# Patient Record
Sex: MALE | Race: BLACK OR AFRICAN AMERICAN | Employment: UNEMPLOYED | URBAN - METROPOLITAN AREA
[De-identification: names, ages, dates, MRNs, and addresses within clinical notes are randomized per-mention and may not be internally consistent; named-entity substitution may affect disease eponyms.]

---

## 2018-03-25 ENCOUNTER — HOSPITAL ENCOUNTER (EMERGENCY)
Age: 58
Discharge: HOME | End: 2018-03-25
Attending: EMERGENCY MEDICINE
Payer: COMMERCIAL

## 2018-03-25 ENCOUNTER — APPOINTMENT (OUTPATIENT)
Dept: GENERAL RADIOLOGY | Age: 58
End: 2018-03-25
Attending: EMERGENCY MEDICINE
Payer: COMMERCIAL

## 2018-03-25 VITALS
DIASTOLIC BLOOD PRESSURE: 116 MMHG | OXYGEN SATURATION: 97 % | RESPIRATION RATE: 20 BRPM | TEMPERATURE: 97.6 F | HEART RATE: 99 BPM | SYSTOLIC BLOOD PRESSURE: 210 MMHG

## 2018-03-25 DIAGNOSIS — I50.9 ACUTE CONGESTIVE HEART FAILURE, UNSPECIFIED CONGESTIVE HEART FAILURE TYPE: Primary | ICD-10-CM

## 2018-03-25 LAB
ALBUMIN SERPL-MCNC: 3.5 G/DL (ref 3.4–5)
ALBUMIN/GLOB SERPL: 0.8 {RATIO} (ref 0.8–1.7)
ALP SERPL-CCNC: 106 U/L (ref 45–117)
ALT SERPL-CCNC: 20 U/L (ref 16–61)
ANION GAP SERPL CALC-SCNC: 10 MMOL/L (ref 3–18)
AST SERPL-CCNC: 19 U/L (ref 15–37)
BASOPHILS # BLD: 0 K/UL (ref 0–0.1)
BASOPHILS NFR BLD: 0 % (ref 0–2)
BILIRUB SERPL-MCNC: 0.9 MG/DL (ref 0.2–1)
BNP SERPL-MCNC: 6267 PG/ML (ref 0–900)
BUN SERPL-MCNC: 16 MG/DL (ref 7–18)
BUN/CREAT SERPL: 16 (ref 12–20)
CALCIUM SERPL-MCNC: 9 MG/DL (ref 8.5–10.1)
CHLORIDE SERPL-SCNC: 109 MMOL/L (ref 100–108)
CK MB CFR SERPL CALC: 2.6 % (ref 0–4)
CK MB CFR SERPL CALC: 3.4 % (ref 0–4)
CK MB SERPL-MCNC: 3.5 NG/ML (ref 5–25)
CK MB SERPL-MCNC: 3.9 NG/ML (ref 5–25)
CK SERPL-CCNC: 116 U/L (ref 39–308)
CK SERPL-CCNC: 137 U/L (ref 39–308)
CO2 SERPL-SCNC: 24 MMOL/L (ref 21–32)
CREAT SERPL-MCNC: 0.97 MG/DL (ref 0.6–1.3)
DIFFERENTIAL METHOD BLD: ABNORMAL
EOSINOPHIL # BLD: 0.1 K/UL (ref 0–0.4)
EOSINOPHIL NFR BLD: 1 % (ref 0–5)
ERYTHROCYTE [DISTWIDTH] IN BLOOD BY AUTOMATED COUNT: 15.2 % (ref 11.6–14.5)
GLOBULIN SER CALC-MCNC: 4.2 G/DL (ref 2–4)
GLUCOSE SERPL-MCNC: 99 MG/DL (ref 74–99)
HCT VFR BLD AUTO: 32.6 % (ref 36–48)
HGB BLD-MCNC: 11.5 G/DL (ref 13–16)
INR PPP: 1.2 (ref 0.8–1.2)
LYMPHOCYTES # BLD: 1.8 K/UL (ref 0.9–3.6)
LYMPHOCYTES NFR BLD: 20 % (ref 21–52)
MAGNESIUM SERPL-MCNC: 2 MG/DL (ref 1.6–2.6)
MCH RBC QN AUTO: 28.5 PG (ref 24–34)
MCHC RBC AUTO-ENTMCNC: 35.3 G/DL (ref 31–37)
MCV RBC AUTO: 80.9 FL (ref 74–97)
MONOCYTES # BLD: 0.6 K/UL (ref 0.05–1.2)
MONOCYTES NFR BLD: 6 % (ref 3–10)
NEUTS SEG # BLD: 6.9 K/UL (ref 1.8–8)
NEUTS SEG NFR BLD: 73 % (ref 40–73)
PLATELET # BLD AUTO: 196 K/UL (ref 135–420)
PMV BLD AUTO: 10.8 FL (ref 9.2–11.8)
POTASSIUM SERPL-SCNC: 3.4 MMOL/L (ref 3.5–5.5)
PROT SERPL-MCNC: 7.7 G/DL (ref 6.4–8.2)
PROTHROMBIN TIME: 14.4 SEC (ref 11.5–15.2)
RBC # BLD AUTO: 4.03 M/UL (ref 4.7–5.5)
SODIUM SERPL-SCNC: 143 MMOL/L (ref 136–145)
TROPONIN I SERPL-MCNC: 0.12 NG/ML (ref 0–0.04)
TROPONIN I SERPL-MCNC: 0.14 NG/ML (ref 0–0.04)
WBC # BLD AUTO: 9.4 K/UL (ref 4.6–13.2)

## 2018-03-25 PROCEDURE — 83880 ASSAY OF NATRIURETIC PEPTIDE: CPT | Performed by: EMERGENCY MEDICINE

## 2018-03-25 PROCEDURE — 80053 COMPREHEN METABOLIC PANEL: CPT | Performed by: EMERGENCY MEDICINE

## 2018-03-25 PROCEDURE — 99284 EMERGENCY DEPT VISIT MOD MDM: CPT

## 2018-03-25 PROCEDURE — 85610 PROTHROMBIN TIME: CPT | Performed by: EMERGENCY MEDICINE

## 2018-03-25 PROCEDURE — 83735 ASSAY OF MAGNESIUM: CPT | Performed by: EMERGENCY MEDICINE

## 2018-03-25 PROCEDURE — 93005 ELECTROCARDIOGRAM TRACING: CPT

## 2018-03-25 PROCEDURE — 82553 CREATINE MB FRACTION: CPT | Performed by: EMERGENCY MEDICINE

## 2018-03-25 PROCEDURE — 85025 COMPLETE CBC W/AUTO DIFF WBC: CPT | Performed by: EMERGENCY MEDICINE

## 2018-03-25 PROCEDURE — 71045 X-RAY EXAM CHEST 1 VIEW: CPT

## 2018-03-25 RX ORDER — FUROSEMIDE 40 MG/1
40 TABLET ORAL DAILY
Qty: 20 TAB | Refills: 0 | Status: SHIPPED | OUTPATIENT
Start: 2018-03-25 | End: 2018-05-04 | Stop reason: SDUPTHER

## 2018-03-25 RX ORDER — METOPROLOL TARTRATE 50 MG/1
50 TABLET ORAL 2 TIMES DAILY
Qty: 60 TAB | Refills: 0 | Status: SHIPPED | OUTPATIENT
Start: 2018-03-25 | End: 2018-05-04 | Stop reason: SDUPTHER

## 2018-03-25 NOTE — DISCHARGE INSTRUCTIONS
Learning About Heart Failure Zones  What are heart failure zones? Heart failure zones give you an easy way to see changes in your heart failure symptoms. They also tell you when you need to get help. Check every day to see which zone you are in. Green zone. You are doing well. This is where you want to be. · Your weight is stable. This means it is not going up or down. · You breathe easily. · You are sleeping well. You are able to lie flat without shortness of breath. · You can do your usual activities. Yellow zone. Be careful. Your symptoms are changing. Call your doctor. · You have new or increased shortness of breath. · You are dizzy or lightheaded, or you feel like you may faint. · You have sudden weight gain, such as more than 2 to 3 pounds in a day or 5 pounds in a week. (Your doctor may suggest a different range of weight gain.)  · You have increased swelling in your legs, ankles, or feet. · You are so tired or weak that you cannot do your usual activities. · You are not sleeping well. Shortness of breath wakes you up at night. You need extra pillows. Your doctor's name: ____________________________________________________________  Your doctor's contact information: _________________________________________________  Red zone. This is an emergency. Call 911. You have symptoms of sudden heart failure, such as:  · You have severe trouble breathing. · You cough up pink, foamy mucus. · You have a new irregular or fast heartbeat. You have symptoms of a heart attack. These may include:  · Chest pain or pressure, or a strange feeling in the chest.  · Sweating. · Shortness of breath. · Nausea or vomiting. · Pain, pressure, or a strange feeling in the back, neck, jaw, or upper belly or in one or both shoulders or arms. · Lightheadedness or sudden weakness. · A fast or irregular heartbeat.   If you have symptoms of a heart attack: After you call 911, the  may tell you to chew 1 adult-strength or 2 to 4 low-dose aspirin. Wait for an ambulance. Do not try to drive yourself. Follow-up care is a key part of your treatment and safety. Be sure to make and go to all appointments, and call your doctor if you are having problems. It's also a good idea to know your test results and keep a list of the medicines you take. Where can you learn more? Go to http://jose-henrik.info/. Enter T174 in the search box to learn more about \"Learning About Heart Failure Zones. \"  Current as of: September 21, 2016  Content Version: 11.4  © 8980-6801 [employer]. Care instructions adapted under license by [employer] (which disclaims liability or warranty for this information). If you have questions about a medical condition or this instruction, always ask your healthcare professional. [facility] any warranty or liability for your use of this information.

## 2018-03-25 NOTE — ED PROVIDER NOTES
EMERGENCY DEPARTMENT HISTORY AND PHYSICAL EXAM    9:57 AM      Date: 3/25/2018  Patient Name: Devan Farah    History of Presenting Illness     Chief Complaint   Patient presents with    Shortness of Breath         History Provided By: Patient    Additional History (Context): Devan Farah is a 66 y.o. male who presents with SOB that began 2 weeks ago has has not been improving. The patient has an associated symptom of wheezing. The patient is also hypertensive. The patient denies chest pain and leg swelling. The patient states that his symptoms are worse at night. PCP: No primary care provider on file. Chief Complaint: SOB  Duration:  2 weeks  Timing:  Not improving  Location: N/A  Quality: N/A  Severity: N/A  Modifying Factors: The patient states his symptoms are worse at night. Associated Symptoms: Wheezing. Denies chest pain and leg swelling. Past History     Past Medical History:  No past medical history on file. Past Surgical History:  No past surgical history on file. Family History:  No family history on file. Social History:  Social History   Substance Use Topics    Smoking status: Not on file    Smokeless tobacco: Not on file    Alcohol use Not on file       Allergies: Allergies not on file      Review of Systems     Review of Systems   Respiratory: Positive for shortness of breath and wheezing. Cardiovascular: Negative for chest pain and leg swelling. All other systems reviewed and are negative. Physical Exam     Visit Vitals    BP (!) 224/128 (BP 1 Location: Left arm, BP Patient Position: At rest)    Pulse 99    Temp 97.6 °F (36.4 °C)    Resp 20    SpO2 99%       Physical Exam   Constitutional: He is oriented to person, place, and time. He appears well-developed. HENT:   Head: Normocephalic and atraumatic. Eyes: EOM are normal. Pupils are equal, round, and reactive to light. Neck: Normal range of motion. Neck supple.    Cardiovascular: Normal rate, regular rhythm and normal heart sounds. Exam reveals no friction rub. No murmur heard. Pulmonary/Chest: Effort normal. No respiratory distress. He has no wheezes. He has rales. Rales bilat. Abdominal: Soft. He exhibits no distension. There is no tenderness. There is no rebound and no guarding. Musculoskeletal: Normal range of motion. Neurological: He is alert and oriented to person, place, and time. Skin: Skin is warm and dry. Psychiatric: He has a normal mood and affect. His behavior is normal. Thought content normal.         Diagnostic Study Results     EKG: Sinus and 94 with a normal axis normal intervals there is no ST elevation or depression and hypertrophy. LVH. T-wave inversions in V5 and V6 consistent with LVH. No piror EKG  He is  Cxr; chf. Globular heart. 11:59 AM Bedside ultrasound showed small pericardia effusion. Medical Decision Making       16-year-old male presents with 2 weeks of worsening shortness of breath worse at night. Blood pressures elevated suspicious for heart failure although he has no leg swelling. Chest x-ray is consistent with heart failure in addition there is a large heart. We will give Lasix here and referred to cardiology. Trop unchanged. Will start on lopressor, lasix. Have cardiology follow this week. Dw/dr Billie Godinez. Agrees. Diagnosis     No diagnosis found. _______________________________    Attestations:  [place] [id number] [facility] acting as a scribe for and in the presence of Sarah Frazier MD      March 25, 2018 at 9:57 AM       Provider Attestation:      I personally performed the services described in the documentation, reviewed the documentation, as recorded by the scribe in my presence, and it accurately and completely records my words and actions.  March 25, 2018 at 9:57 AM - Sarah Frazier MD    _______________________________

## 2018-03-26 ENCOUNTER — TELEPHONE (OUTPATIENT)
Dept: CARDIOLOGY CLINIC | Age: 58
End: 2018-03-26

## 2018-03-26 DIAGNOSIS — I50.9 CONGESTIVE HEART FAILURE, UNSPECIFIED CONGESTIVE HEART FAILURE CHRONICITY, UNSPECIFIED CONGESTIVE HEART FAILURE TYPE: Primary | ICD-10-CM

## 2018-03-26 LAB
ATRIAL RATE: 94 BPM
CALCULATED P AXIS, ECG09: 63 DEGREES
CALCULATED R AXIS, ECG10: 52 DEGREES
CALCULATED T AXIS, ECG11: 121 DEGREES
DIAGNOSIS, 93000: NORMAL
P-R INTERVAL, ECG05: 198 MS
Q-T INTERVAL, ECG07: 380 MS
QRS DURATION, ECG06: 106 MS
QTC CALCULATION (BEZET), ECG08: 475 MS
VENTRICULAR RATE, ECG03: 94 BPM

## 2018-03-26 NOTE — TELEPHONE ENCOUNTER
Attempted to contact patient to get a NP appointment scheduled with Dr. Zhou Joe in 1- 2 weeks. I was asked to call back in a couple of hours . Addie Carroll As they were at a doctors appointment. Per Dr. Zhou Joe . Addie Carroll Patient will need an Echo (for CHF). .. I am requesting for Bayr to put the order in.

## 2018-03-26 NOTE — TELEPHONE ENCOUNTER
----- Message from Sissy Portillo sent at 3/26/2018 10:41 AM EDT -----  Per Dr. Jann Becker . .. Patient needs an Echo for CHF . .. Could you please put the order in? I am contacting patient to set up his appointment with Dr. Jann Becker.

## 2018-05-04 RX ORDER — METOPROLOL TARTRATE 50 MG/1
50 TABLET ORAL 2 TIMES DAILY
Qty: 60 TAB | Refills: 6 | Status: SHIPPED | OUTPATIENT
Start: 2018-05-04 | End: 2019-01-16

## 2018-05-04 RX ORDER — FUROSEMIDE 40 MG/1
40 TABLET ORAL DAILY
Qty: 30 TAB | Refills: 6 | Status: SHIPPED | OUTPATIENT
Start: 2018-05-04 | End: 2018-05-24

## 2018-05-22 ENCOUNTER — HOSPITAL ENCOUNTER (OUTPATIENT)
Dept: NON INVASIVE DIAGNOSTICS | Age: 58
Discharge: HOME | End: 2018-05-22
Attending: INTERNAL MEDICINE
Payer: COMMERCIAL

## 2018-05-22 ENCOUNTER — DOCUMENTATION ONLY (OUTPATIENT)
Dept: CARDIOLOGY | Age: 58
End: 2018-05-22

## 2018-05-22 DIAGNOSIS — I50.9 CONGESTIVE HEART FAILURE (HCC): ICD-10-CM

## 2018-05-22 PROCEDURE — 93306 TTE W/DOPPLER COMPLETE: CPT

## 2018-05-22 NOTE — PROGRESS NOTES
Completed echocardiogram. Report to follow. I removed the arm band and placed in shred box. Patient is aware blood pressure is high (233/140) and does not feel any discomfort. Advised patient the severity and to take his medication when he gets home.      Octavio Buchanan

## 2018-05-31 NOTE — PROGRESS NOTES
Per your last note \" Per Dr. Eunice Hinson . .. Patient needs an Echo for CHF . .. Could you please put the order in?  I am contacting patient to set up his appointment with Dr. Eunice Hinson.

## 2018-06-05 ENCOUNTER — TELEPHONE (OUTPATIENT)
Dept: CARDIOLOGY CLINIC | Age: 58
End: 2018-06-05

## 2018-06-05 NOTE — TELEPHONE ENCOUNTER
----- Message from Peola Kayser, MD sent at 6/1/2018 11:03 AM EDT -----  Apparently this echocardiogram was ordered via the emergency department. He has never seen me in the office, but his ejection fraction is severely depressed. He will need appointment with me sometime this month  ----- Message -----     From: Lindsay Foss RN     Sent: 5/31/2018  11:06 AM       To: Peola Kayser, MD    Per your last note \" Per Dr. Oscar Brar . .. Patient needs an Echo for CHF . .. Could you please put the order in?  I am contacting patient to set up his appointment with Dr. Oscar Brar.

## 2018-07-25 ENCOUNTER — TELEPHONE (OUTPATIENT)
Dept: CARDIOLOGY CLINIC | Age: 58
End: 2018-07-25

## 2018-07-25 NOTE — TELEPHONE ENCOUNTER
Called patient a couple times today to remind of appt today at 4:20pm with Dr. Noel Valencia. Got in touch with pt's wife to explain to her the importance of his appointment. Left patient 2 VM's. He will need to be rescheduled.

## 2018-07-26 ENCOUNTER — OFFICE VISIT (OUTPATIENT)
Dept: CARDIOLOGY CLINIC | Age: 58
End: 2018-07-26

## 2018-07-26 VITALS
HEART RATE: 93 BPM | SYSTOLIC BLOOD PRESSURE: 210 MMHG | WEIGHT: 162 LBS | BODY MASS INDEX: 25.43 KG/M2 | HEIGHT: 67 IN | OXYGEN SATURATION: 91 % | DIASTOLIC BLOOD PRESSURE: 120 MMHG

## 2018-07-26 DIAGNOSIS — I50.22 CHRONIC SYSTOLIC CONGESTIVE HEART FAILURE (HCC): ICD-10-CM

## 2018-07-26 DIAGNOSIS — R06.02 SOB (SHORTNESS OF BREATH): ICD-10-CM

## 2018-07-26 DIAGNOSIS — I42.9 CARDIOMYOPATHY, UNSPECIFIED TYPE (HCC): Primary | ICD-10-CM

## 2018-07-26 DIAGNOSIS — I10 ESSENTIAL HYPERTENSION: ICD-10-CM

## 2018-07-26 RX ORDER — LOSARTAN POTASSIUM 25 MG/1
25 TABLET ORAL DAILY
Qty: 30 TAB | Refills: 2 | Status: SHIPPED | OUTPATIENT
Start: 2018-07-26 | End: 2019-01-16

## 2018-07-26 RX ORDER — LOSARTAN POTASSIUM 25 MG/1
25 TABLET ORAL DAILY
COMMUNITY
End: 2018-07-26 | Stop reason: SDUPTHER

## 2018-07-26 NOTE — PROGRESS NOTES
History of Present Illness:  A 66 y.o. male referred from the emergency department initially for congestive heart failure. He had an echocardiogram with ejection fraction found to be 20%. He has had longstanding history of hypertension for about 15 years. He also started smoking in his 29's. He has a strong family history of premature coronary artery disease as well. He is accompanied by his significant other. He denies chest pain, syncope. He gets dyspnea from time to time. 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 He denies any palpitations or syncope. He continues to smoke. His systolic is approximately 200 mmHg despite taking Metoprolol. Today, he has no symptoms. Impression/Plan:   Chronic systolic heart failure, recent new diagnosis March 2018. He has a new diagnosis of cardiomyopathy with multiple risk factors and EF 20%, restrictive pattern as well with diastology. Abnormal EKG likely with LVH and repolarization abnormality. History of ongoing tobacco use. Hypertension poorly controlled despite beta-blocker. Family history of premature coronary artery disease. At this point, I recommend heart catheterization to exclude underlying coronary artery disease given his multiple risk factors. I will start him on Losartan 25 mg a day, check BMP in a few weeks and have him see an advanced practitioner to up-titrate his ARB as well as his beta-blocker. I discussed the increased risk for sudden cardiac death. I will plan to see him back and repeat an echocardiogram in 3 months. If his ejection fraction is still depressed, I did discuss potential need for AICD. All questions answered. Smoking cessation discussed as well. No past medical history on file. Current Outpatient Prescriptions   Medication Sig Dispense Refill    metoprolol tartrate (LOPRESSOR) 50 mg tablet Take 1 Tab by mouth two (2) times a day. 60 Tab 6       Social History   has no tobacco history on file.    has no alcohol history on file.    Family History  family history is not on file. Review of Systems  Except as stated above include:  Constitutional: Negative for fever, chills and malaise/fatigue. HEENT: No congestion or recent URI. Gastrointestinal: No nausea, vomiting, abdominal pain, bloody stools. Pulmonary:  Negative except as stated above. Cardiac:  Negative except as stated above. Musculoskeletal: Negative except as stated above. Neurological:  No localized symptoms. Skin:  Negative except as stated above. Psych:  Negative except as stated above. Endocrine:  Negative except as stated above. PHYSICAL EXAM  BP Readings from Last 3 Encounters:   07/26/18 (!) 210/120   03/25/18 (!) 210/116     Pulse Readings from Last 3 Encounters:   07/26/18 93   03/25/18 99     Wt Readings from Last 3 Encounters:   07/26/18 73.5 kg (162 lb)     General:   Well developed, well groomed. Head/Neck:   No jugular venous distention     No carotid bruits. No evidence of xanthelasma. Lungs:   No respiratory distress. Clear bilaterally. Heart:    Regular rate and rhythm. Normal S1/S2. Palpation of heart with normal point of maximum impulse. No significant murmurs, rubs or gallops. Abdomen:   Soft and nontender. No palpable abdominal mass or bruits. Extremities:   Intact peripheral pulses. No significant edema. Neurological:   Alert and oriented to person, place, time. No focal neurological deficit visually.   Skin:   No obvious rash    Blood Pressure Metric:  Gerard Collins has been given the following recommendations today due to his elevated BP reading: starting ARB, will uptitrate in next couple week

## 2018-07-26 NOTE — PATIENT INSTRUCTIONS
[facility]          Patient  EP Instructions                  1. You are scheduled to have a Left Heart Catheterization   on  August 2, 2018 , at 10:30 am.    Please check in at 9:30 am.    2. Please go to [facility] and park in the outpatient parking lot that is located around to the back of the hospital and enter through the [employer]. Once you enter through the [facility] check in with the  there. The  will either give you directions or assist you in getting to the cath holding area. 3.  [x]       You are not to eat or drink anything after midnight the morning of the               procedure. 4. Please continue to take your medications with a small sip of water on the morning of the procedure. 5. If you are diabetic, do not take your insulin/sugar pill the morning of the procedure. 6. We encourage families to wait in the waiting room on the first floor while the procedure is being done. The Doctor will come out and talk with you as soon as the procedure is over. 7. There is the possibility that you may spend the night in the hospital, depending on the results of the procedure. This will be determined after the procedure is done. 8.   If you or your family have any questions, please call our office Monday-Friday 9:00am         -4:30 pm , at [phone], and ask to speak to one of the nurses.

## 2018-07-26 NOTE — MR AVS SNAPSHOT
[address] [place] [address] Alex Hurt [address] 
[phone] Patient: Jeff Clements MRN: [id number] :1960 Visit Information Date & Time Provider Department Dept. Phone Encounter #  
 2018  8:20 AM Jimbo De La Vega MD [facility] [address] [id number] Upcoming Health Maintenance Date Due Hepatitis C Screening 1960 Pneumococcal 19-64 Medium Risk (1 of 1 - PPSV23) 1981 DTaP/Tdap/Td series (1 - Tdap) 1983 FOBT Q 1 YEAR AGE 50-75 2012 Influenza Age 5 to Adult 2018 Allergies as of 2018  Review Complete On: 2018 By: Jimbo De La Vega MD  
 Not on File Current Immunizations  Never Reviewed No immunizations on file. Not reviewed this visit You Were Diagnosed With   
  
 Codes Comments Cardiomyopathy, unspecified type ([facility]    -  Primary ICD-10-CM: I42.9 ICD-9-CM: 425.4 SOB (shortness of breath)     ICD-10-CM: R06.02 
ICD-9-CM: 786.05 Chronic systolic congestive heart failure (HCC)     ICD-10-CM: I50.22 ICD-9-CM: 428.22, 428.0 Vitals BP Pulse Height(growth percentile) Weight(growth percentile) SpO2 BMI  
 (!) 210/120 93 5' 7\" (1.702 m) 162 lb (73.5 kg) 91% 25.37 kg/m2 Vitals History BMI and BSA Data Body Mass Index Body Surface Area  
 25.37 kg/m 2 1.86 m 2 Preferred Pharmacy Pharmacy Name Phone [address], [address] [place] [address] [phone] Your Updated Medication List  
  
   
This list is accurate as of 18  9:02 AM.  Always use your most recent med list.  
  
  
  
  
 losartan 25 mg tablet Commonly known as:  COZAAR Take 1 Tab by mouth daily. metoprolol tartrate 50 mg tablet Commonly known as:  LOPRESSOR Take 1 Tab by mouth two (2) times a day. Prescriptions Sent to Pharmacy Refills losartan (COZAAR) 25 mg tablet 2 Sig: Take 1 Tab by mouth daily. Class: Normal  
 Pharmacy: [id number] [facility], [address] [facility] #: [phone] Route: Oral  
  
We Performed the Following AMB POC EKG ROUTINE W/ 12 LEADS, INTER & REP [40888 CPT(R)] To-Do List   
 07/26/2018 Imaging:  XR CHEST PA LAT   
  
 07/27/2018 Lab:  CBC WITH AUTOMATED DIFF   
  
 07/27/2018 Lab:  METABOLIC PANEL, COMPREHENSIVE   
  
 07/27/2018 Lab:  PROTHROMBIN TIME + INR   
  
 08/12/2018 Lab:  METABOLIC PANEL, BASIC Patient Instructions DR. COBIAN [facility] [place] Patient  EP Instructions 1. You are scheduled to have a Left Heart Catheterization   on  August 2, 2018 , at 10:30 am.    Please check in at 9:30 am. 
 
2. Please go to [facility] and park in the outpatient parking lot that is located around to the back of the hospital and enter through the [employer]. Once you enter through the [facility] check in with the  there. The  will either give you directions or assist you in getting to the cath holding area. 3.  [x]       You are not to eat or drink anything after midnight the morning of the  
            procedure. 4. Please continue to take your medications with a small sip of water on the morning of the procedure. 5. If you are diabetic, do not take your insulin/sugar pill the morning of the procedure. 6. We encourage families to wait in the waiting room on the first floor while the procedure is being done. The Doctor will come out and talk with you as soon as the procedure is over. 7. There is the possibility that you may spend the night in the hospital, depending on the results of the procedure. This will be determined after the procedure is done.   
 
8.   If you or your family have any questions, please call our office Monday-Friday 9:00am  
      -4:30 pm , at [phone], and ask to speak to one of the nurses. Introducing hospitals & HEALTH SERVICES! [employer] introduces [employer] patient portal. Now you can access parts of your medical record, email your doctor's office, and request medication refills online. 1. In your internet browser, go to [email or web address] 2. Click on the First Time User? Click Here link in the Sign In box. You will see the New Member Sign Up page. 3. Enter your [employer] Access Code exactly as it appears below. You will not need to use this code after youve completed the sign-up process. If you do not sign up before the expiration date, you must request a new code. · [employer] Access Code: [email or web address] Expires: 10/1/2018  5:18 AM 
 
4. Enter the last four digits of your Social Security Number (xxxx) and Date of Birth (mm/dd/yyyy) as indicated and click Submit. You will be taken to the next sign-up page. 5. Create a [employer] ID. This will be your [employer] login ID and cannot be changed, so think of one that is secure and easy to remember. 6. Create a [employer] password. You can change your password at any time. 7. Enter your Password Reset Question and Answer. This can be used at a later time if you forget your password. 8. Enter your e-mail address. You will receive e-mail notification when new information is available in [address] 9. Click Sign Up. You can now view and download portions of your medical record. 10. Click the Download Summary menu link to download a portable copy of your medical information. If you have questions, please visit the Frequently Asked Questions section of the [employer] website. Remember, [employer] is NOT to be used for urgent needs. For medical emergencies, dial 911. Now available from your iPhone and Android! Please provide this summary of care documentation to your next provider. Your primary care clinician is listed as NONE. If you have any questions after today's visit, please call [phone].

## 2018-07-26 NOTE — LETTER
2018 9:03 AM 
 
 
 
Meghna Jones 
[id number] 
1960 Insurance:  [employer]               Auth # _____________________ Proc Date:                 Proc Time:  10:30am  
 
Performing MD : Dr. Rodgers Files                      Procedure:Left Cath w/Radial Approach Hospital:  [facility]                                            PCP Not Known Scheduled with:  Meredith/EMail                                                        Date:2018 HP:       EK/26  Labs:______  CXR: _______  Orders:   Special Instructions:  _____________________________________________________ 
______________________________________________________________________ 
______________________________________________________________________ Date Faxed:   ______________   Pages Faxed: ___________________ The materials enclosed with this facsimile transmission are private and confidential and are the property of the sender. If you are not the intended recipient, be advised that any unauthorized use, disclosure, copying, distribution, or the taking of any action in reliance on the contents of this telecopied information is strictly prohibited. If you have received this in error, please immediately notify the sender via telephone to arrange for return of the forwarded documentation.

## 2018-08-03 ENCOUNTER — TELEPHONE (OUTPATIENT)
Dept: CARDIOLOGY CLINIC | Age: 58
End: 2018-08-03

## 2018-08-03 NOTE — TELEPHONE ENCOUNTER
Patient did not have pre-procedure labs or x-ray   (xray from 3/25/18 was abnormal) before missing his last appt for cath. Patient needs to have pre-procedure labs & xray. Labs to be done on or after 8/6/18. Instructions    Patients Name:  Deonte Primer    1. You are scheduled to have a Left Heart Catheterization on August 13, 2018  at 10:30 am.  Please check in at 9:30 am  .      2. Please go to [facility] and park in the outpatient parking lot that is located around to the back of the hospital and enter through the [employer]. Once you enter through the [facility] check in with the  there. The  will either give you directions or assist you in getting to the cath holding area. 3. You are not to eat or drink anything after midnight the night before your procedure. Small sips of water to take your medications is ok. 4. If you are diabetic, do not take your insulin/sugar pill the morning of the procedure. 5. MEDICATION INSTRUCTIONS:   Please take your morning medications with the following special instructions:    [x]          Please make sure to take your Blood pressure medication : Losartan and Metoprolol    6. We encourage families to wait in the waiting room on the first floor while the procedure is being done. The Doctor will come out and talk with you as soon as the procedure is over. 7. There is the possibility that you may spend the night in the hospital, depending on the results of the procedure. This will be determined after the procedure is done. If angioplasty or stent is planned, you will stay at least one day. 8. If you or your family have any questions, please call our office Monday -Friday, 9:00 a.m.-4:30 p.m.,  At [phone], and ask to speak to one of the nurses.

## 2018-08-03 NOTE — LETTER
8/9/2018 Mr. Nba [address] [address] Dear  Whit Rupa, We have been unable to reach you by phone to notify you of instructions for your upcoming procedures, several attempts have been made with no success. Please call our office at [phone] and ask to speak with my nurse in order to explain these results to you and advise you of any recommendations. Sincerely, Lelo Louis MD

## 2018-08-09 NOTE — TELEPHONE ENCOUNTER
I have attempted to contact patient and/or his wife to inform patient of pre-procedure instructions. I have attempted all numbers provided and left numerous messages, with no call back as of yet. Sent a letter to patient to please contact our office as we are unable to get in contact with him.

## 2018-08-14 ENCOUNTER — DOCUMENTATION ONLY (OUTPATIENT)
Dept: CARDIOLOGY CLINIC | Age: 58
End: 2018-08-14

## 2018-08-14 NOTE — PROGRESS NOTES
Patient no-showed for Left Cath that was originally scheduled for 8/2/18. Henrietta Lima called him . Kristen Katz He stated that he forgot and was at work. She rescheduled the procedure for 8/13/18. He no-showed on that date also.

## 2018-08-15 ENCOUNTER — DOCUMENTATION ONLY (OUTPATIENT)
Dept: CARDIOLOGY CLINIC | Age: 58
End: 2018-08-15

## 2018-08-15 NOTE — PROGRESS NOTES
Osmar   Male, 66 y.o., 1960  Weight:   73.5 kg (162 lb)  Phone:   [phone]  PCP:   None  MRN:   [id number]  MyChart:   Pending  Next Appt (With Me)  None  Next Appt (Any Provider)  None    Message  Received: Sixto Bartlett MD sent to [facility], thanks for trying.  We'll just continue medical therapy for now then and if he wants to proceed, he can call us back. thx            Previous Messages       ----- Message -----   Cassie Daughters From: Liv Smith      Sent: 8/14/2018   3:01 PM        To: Joann Lynn MD, Liv Smith     Patient no-showed for Left Cath that was originally scheduled for 8/2/18. Willie Montalvo called him . Genie Parikh He stated that he forgot and was at work. She rescheduled the procedure for 8/13/18.  He no-showed on that date also.

## 2019-01-13 ENCOUNTER — APPOINTMENT (OUTPATIENT)
Dept: GENERAL RADIOLOGY | Age: 59
DRG: 247 | End: 2019-01-13
Attending: STUDENT IN AN ORGANIZED HEALTH CARE EDUCATION/TRAINING PROGRAM
Payer: SUBSIDIZED

## 2019-01-13 ENCOUNTER — HOSPITAL ENCOUNTER (INPATIENT)
Age: 59
LOS: 3 days | Discharge: HOME HEALTH | DRG: 247 | End: 2019-01-16
Attending: EMERGENCY MEDICINE | Admitting: INTERNAL MEDICINE
Payer: SUBSIDIZED

## 2019-01-13 DIAGNOSIS — I50.23 ACUTE ON CHRONIC SYSTOLIC CONGESTIVE HEART FAILURE (HCC): Primary | ICD-10-CM

## 2019-01-13 DIAGNOSIS — I10 ESSENTIAL HYPERTENSION: ICD-10-CM

## 2019-01-13 DIAGNOSIS — N17.9 ACUTE KIDNEY INJURY (HCC): ICD-10-CM

## 2019-01-13 DIAGNOSIS — I25.119 CORONARY ARTERY DISEASE INVOLVING NATIVE HEART WITH ANGINA PECTORIS, UNSPECIFIED VESSEL OR LESION TYPE (HCC): ICD-10-CM

## 2019-01-13 PROBLEM — Z72.0 TOBACCO ABUSE: Status: ACTIVE | Noted: 2019-01-13

## 2019-01-13 PROBLEM — Z91.199 NON-COMPLIANCE: Status: ACTIVE | Noted: 2019-01-13

## 2019-01-13 PROBLEM — I50.43 ACUTE ON CHRONIC COMBINED SYSTOLIC AND DIASTOLIC CONGESTIVE HEART FAILURE (HCC): Status: ACTIVE | Noted: 2019-01-13

## 2019-01-13 PROBLEM — R06.03 ACUTE RESPIRATORY DISTRESS: Status: ACTIVE | Noted: 2019-01-13

## 2019-01-13 PROBLEM — I50.9 CHF (CONGESTIVE HEART FAILURE) (HCC): Status: ACTIVE | Noted: 2019-01-13

## 2019-01-13 PROBLEM — J81.0 ACUTE PULMONARY EDEMA (HCC): Status: ACTIVE | Noted: 2019-01-13

## 2019-01-13 LAB
ALBUMIN SERPL-MCNC: 3 G/DL (ref 3.4–5)
ALBUMIN/GLOB SERPL: 0.6 {RATIO} (ref 0.8–1.7)
ALP SERPL-CCNC: 100 U/L (ref 45–117)
ALT SERPL-CCNC: 123 U/L (ref 16–61)
ANION GAP SERPL CALC-SCNC: 11 MMOL/L (ref 3–18)
APPEARANCE UR: CLEAR
ARTERIAL PATENCY WRIST A: YES
ARTERIAL PATENCY WRIST A: YES
AST SERPL-CCNC: 99 U/L (ref 15–37)
BACTERIA URNS QL MICRO: ABNORMAL /HPF
BASE DEFICIT BLD-SCNC: 2 MMOL/L
BASE EXCESS BLD CALC-SCNC: 1 MMOL/L
BASOPHILS # BLD: 0 K/UL (ref 0–0.1)
BASOPHILS NFR BLD: 0 % (ref 0–2)
BDY SITE: ABNORMAL
BDY SITE: ABNORMAL
BILIRUB SERPL-MCNC: 2.4 MG/DL (ref 0.2–1)
BILIRUB UR QL: NEGATIVE
BNP SERPL-MCNC: ABNORMAL PG/ML (ref 0–900)
BODY TEMPERATURE: 37
BUN SERPL-MCNC: 28 MG/DL (ref 7–18)
BUN/CREAT SERPL: 15 (ref 12–20)
CALCIUM SERPL-MCNC: 8.7 MG/DL (ref 8.5–10.1)
CHLORIDE SERPL-SCNC: 104 MMOL/L (ref 100–108)
CK MB CFR SERPL CALC: 1.9 % (ref 0–4)
CK MB SERPL-MCNC: 3.2 NG/ML (ref 5–25)
CK SERPL-CCNC: 171 U/L (ref 39–308)
CO2 SERPL-SCNC: 26 MMOL/L (ref 21–32)
COLOR UR: ABNORMAL
CREAT SERPL-MCNC: 1.92 MG/DL (ref 0.6–1.3)
DIFFERENTIAL METHOD BLD: ABNORMAL
EOSINOPHIL # BLD: 0 K/UL (ref 0–0.4)
EOSINOPHIL NFR BLD: 0 % (ref 0–5)
EPITH CASTS URNS QL MICRO: ABNORMAL /LPF (ref 0–5)
ERYTHROCYTE [DISTWIDTH] IN BLOOD BY AUTOMATED COUNT: 18 % (ref 11.6–14.5)
GAS FLOW.O2 O2 DELIVERY SYS: ABNORMAL L/MIN
GAS FLOW.O2 O2 DELIVERY SYS: ABNORMAL L/MIN
GAS FLOW.O2 SETTING OXYMISER: 2 L/M
GLOBULIN SER CALC-MCNC: 5 G/DL (ref 2–4)
GLUCOSE SERPL-MCNC: 108 MG/DL (ref 74–99)
GLUCOSE UR STRIP.AUTO-MCNC: NEGATIVE MG/DL
HCO3 BLD-SCNC: 19.6 MMOL/L (ref 22–26)
HCO3 BLD-SCNC: 23.7 MMOL/L (ref 22–26)
HCT VFR BLD AUTO: 30.9 % (ref 36–48)
HGB BLD-MCNC: 10.3 G/DL (ref 13–16)
HGB UR QL STRIP: NEGATIVE
INR PPP: 1.4 (ref 0.8–1.2)
KETONES UR QL STRIP.AUTO: NEGATIVE MG/DL
LACTATE BLD-SCNC: 2.75 MMOL/L (ref 0.4–2)
LACTATE BLD-SCNC: 3.15 MMOL/L (ref 0.4–2)
LEUKOCYTE ESTERASE UR QL STRIP.AUTO: ABNORMAL
LYMPHOCYTES # BLD: 1.4 K/UL (ref 0.9–3.6)
LYMPHOCYTES NFR BLD: 21 % (ref 21–52)
MCH RBC QN AUTO: 24.8 PG (ref 24–34)
MCHC RBC AUTO-ENTMCNC: 33.3 G/DL (ref 31–37)
MCV RBC AUTO: 74.5 FL (ref 74–97)
MONOCYTES # BLD: 0.4 K/UL (ref 0.05–1.2)
MONOCYTES NFR BLD: 6 % (ref 3–10)
MUCOUS THREADS URNS QL MICRO: ABNORMAL /LPF
NEUTS SEG # BLD: 4.9 K/UL (ref 1.8–8)
NEUTS SEG NFR BLD: 73 % (ref 40–73)
NITRITE UR QL STRIP.AUTO: NEGATIVE
O2/TOTAL GAS SETTING VFR VENT: 45 %
PCO2 BLD: 22.9 MMHG (ref 35–45)
PCO2 BLD: 30.4 MMHG (ref 35–45)
PEEP RESPIRATORY: 5 CMH2O
PH BLD: 7.5 [PH] (ref 7.35–7.45)
PH BLD: 7.54 [PH] (ref 7.35–7.45)
PH UR STRIP: 7 [PH] (ref 5–8)
PIP ISTAT,IPIP: 12
PLATELET # BLD AUTO: 268 K/UL (ref 135–420)
PLATELET COMMENTS,PCOM: ABNORMAL
PMV BLD AUTO: 10.7 FL (ref 9.2–11.8)
PO2 BLD: 181 MMHG (ref 80–100)
PO2 BLD: 93 MMHG (ref 80–100)
POTASSIUM SERPL-SCNC: 5.1 MMOL/L (ref 3.5–5.5)
PRESSURE SUPPORT SETTING VENT: 7 CMH2O
PROT SERPL-MCNC: 8 G/DL (ref 6.4–8.2)
PROT UR STRIP-MCNC: 100 MG/DL
PROTHROMBIN TIME: 16.7 SEC (ref 11.5–15.2)
RBC # BLD AUTO: 4.15 M/UL (ref 4.7–5.5)
RBC #/AREA URNS HPF: ABNORMAL /HPF (ref 0–5)
SALICYLATES SERPL-MCNC: <2.8 MG/DL (ref 2.8–20)
SAO2 % BLD: 100 % (ref 92–97)
SAO2 % BLD: 98 % (ref 92–97)
SERVICE CMNT-IMP: ABNORMAL
SERVICE CMNT-IMP: ABNORMAL
SODIUM SERPL-SCNC: 141 MMOL/L (ref 136–145)
SP GR UR REFRACTOMETRY: 1.01 (ref 1–1.03)
SPECIMEN TYPE: ABNORMAL
SPECIMEN TYPE: ABNORMAL
SPONTANEOUS TIMED, IST: YES
TOTAL RESP. RATE, ITRR: 24
TOTAL RESP. RATE, ITRR: 25
TROPONIN I SERPL-MCNC: 0.31 NG/ML (ref 0–0.04)
TROPONIN I SERPL-MCNC: 0.33 NG/ML (ref 0–0.04)
UROBILINOGEN UR QL STRIP.AUTO: >8 EU/DL (ref 0.2–1)
WBC # BLD AUTO: 6.7 K/UL (ref 4.6–13.2)
WBC URNS QL MICRO: ABNORMAL /HPF (ref 0–4)

## 2019-01-13 PROCEDURE — 85025 COMPLETE CBC W/AUTO DIFF WBC: CPT

## 2019-01-13 PROCEDURE — 71045 X-RAY EXAM CHEST 1 VIEW: CPT

## 2019-01-13 PROCEDURE — 80307 DRUG TEST PRSMV CHEM ANLYZR: CPT

## 2019-01-13 PROCEDURE — 96361 HYDRATE IV INFUSION ADD-ON: CPT

## 2019-01-13 PROCEDURE — 83605 ASSAY OF LACTIC ACID: CPT

## 2019-01-13 PROCEDURE — 93005 ELECTROCARDIOGRAM TRACING: CPT

## 2019-01-13 PROCEDURE — 82550 ASSAY OF CK (CPK): CPT

## 2019-01-13 PROCEDURE — 74011250636 HC RX REV CODE- 250/636: Performed by: STUDENT IN AN ORGANIZED HEALTH CARE EDUCATION/TRAINING PROGRAM

## 2019-01-13 PROCEDURE — 94660 CPAP INITIATION&MGMT: CPT

## 2019-01-13 PROCEDURE — 85610 PROTHROMBIN TIME: CPT

## 2019-01-13 PROCEDURE — 74011000250 HC RX REV CODE- 250: Performed by: STUDENT IN AN ORGANIZED HEALTH CARE EDUCATION/TRAINING PROGRAM

## 2019-01-13 PROCEDURE — 36600 WITHDRAWAL OF ARTERIAL BLOOD: CPT

## 2019-01-13 PROCEDURE — 65660000000 HC RM CCU STEPDOWN

## 2019-01-13 PROCEDURE — 83880 ASSAY OF NATRIURETIC PEPTIDE: CPT

## 2019-01-13 PROCEDURE — 81001 URINALYSIS AUTO W/SCOPE: CPT

## 2019-01-13 PROCEDURE — 82803 BLOOD GASES ANY COMBINATION: CPT

## 2019-01-13 PROCEDURE — 99285 EMERGENCY DEPT VISIT HI MDM: CPT

## 2019-01-13 PROCEDURE — 74011250636 HC RX REV CODE- 250/636: Performed by: INTERNAL MEDICINE

## 2019-01-13 PROCEDURE — 74011250637 HC RX REV CODE- 250/637: Performed by: INTERNAL MEDICINE

## 2019-01-13 PROCEDURE — 87040 BLOOD CULTURE FOR BACTERIA: CPT

## 2019-01-13 PROCEDURE — 80053 COMPREHEN METABOLIC PANEL: CPT

## 2019-01-13 PROCEDURE — 96374 THER/PROPH/DIAG INJ IV PUSH: CPT

## 2019-01-13 RX ORDER — NITROGLYCERIN 40 MG/100ML
0-200 INJECTION INTRAVENOUS
Status: DISCONTINUED | OUTPATIENT
Start: 2019-01-13 | End: 2019-01-13

## 2019-01-13 RX ORDER — DOCUSATE SODIUM 100 MG/1
100 CAPSULE, LIQUID FILLED ORAL
Status: DISCONTINUED | OUTPATIENT
Start: 2019-01-13 | End: 2019-01-16 | Stop reason: HOSPADM

## 2019-01-13 RX ORDER — METOPROLOL TARTRATE 50 MG/1
50 TABLET ORAL 2 TIMES DAILY
Status: DISCONTINUED | OUTPATIENT
Start: 2019-01-13 | End: 2019-01-13

## 2019-01-13 RX ORDER — LABETALOL HCL 20 MG/4 ML
5 SYRINGE (ML) INTRAVENOUS
Status: COMPLETED | OUTPATIENT
Start: 2019-01-13 | End: 2019-01-13

## 2019-01-13 RX ORDER — ONDANSETRON 2 MG/ML
4 INJECTION INTRAMUSCULAR; INTRAVENOUS
Status: DISCONTINUED | OUTPATIENT
Start: 2019-01-13 | End: 2019-01-16 | Stop reason: HOSPADM

## 2019-01-13 RX ORDER — ACETAMINOPHEN 325 MG/1
650 TABLET ORAL
Status: DISCONTINUED | OUTPATIENT
Start: 2019-01-13 | End: 2019-01-16 | Stop reason: HOSPADM

## 2019-01-13 RX ORDER — GUAIFENESIN 100 MG/5ML
81 LIQUID (ML) ORAL DAILY
Status: DISCONTINUED | OUTPATIENT
Start: 2019-01-14 | End: 2019-01-16 | Stop reason: HOSPADM

## 2019-01-13 RX ORDER — LABETALOL HYDROCHLORIDE 5 MG/ML
5 INJECTION, SOLUTION INTRAVENOUS ONCE
Status: COMPLETED | OUTPATIENT
Start: 2019-01-13 | End: 2019-01-13

## 2019-01-13 RX ORDER — IBUPROFEN 200 MG
1 TABLET ORAL DAILY
Status: DISCONTINUED | OUTPATIENT
Start: 2019-01-14 | End: 2019-01-16 | Stop reason: HOSPADM

## 2019-01-13 RX ORDER — OXYCODONE AND ACETAMINOPHEN 5; 325 MG/1; MG/1
1 TABLET ORAL
Status: DISCONTINUED | OUTPATIENT
Start: 2019-01-13 | End: 2019-01-16 | Stop reason: HOSPADM

## 2019-01-13 RX ORDER — NITROGLYCERIN 40 MG/100ML
20 INJECTION INTRAVENOUS
Status: DISCONTINUED | OUTPATIENT
Start: 2019-01-13 | End: 2019-01-13

## 2019-01-13 RX ORDER — HYDRALAZINE HYDROCHLORIDE 25 MG/1
25 TABLET, FILM COATED ORAL 3 TIMES DAILY
Status: DISCONTINUED | OUTPATIENT
Start: 2019-01-13 | End: 2019-01-16 | Stop reason: HOSPADM

## 2019-01-13 RX ORDER — AMLODIPINE BESYLATE 10 MG/1
10 TABLET ORAL DAILY
Status: DISCONTINUED | OUTPATIENT
Start: 2019-01-13 | End: 2019-01-16 | Stop reason: HOSPADM

## 2019-01-13 RX ORDER — ISOSORBIDE MONONITRATE 30 MG/1
30 TABLET, EXTENDED RELEASE ORAL DAILY
Status: DISCONTINUED | OUTPATIENT
Start: 2019-01-14 | End: 2019-01-16 | Stop reason: HOSPADM

## 2019-01-13 RX ORDER — HYDRALAZINE HYDROCHLORIDE 20 MG/ML
10 INJECTION INTRAMUSCULAR; INTRAVENOUS
Status: DISCONTINUED | OUTPATIENT
Start: 2019-01-13 | End: 2019-01-16 | Stop reason: HOSPADM

## 2019-01-13 RX ORDER — ENOXAPARIN SODIUM 100 MG/ML
40 INJECTION SUBCUTANEOUS EVERY 24 HOURS
Status: DISCONTINUED | OUTPATIENT
Start: 2019-01-13 | End: 2019-01-13

## 2019-01-13 RX ORDER — FUROSEMIDE 10 MG/ML
80 INJECTION INTRAMUSCULAR; INTRAVENOUS ONCE
Status: COMPLETED | OUTPATIENT
Start: 2019-01-13 | End: 2019-01-13

## 2019-01-13 RX ORDER — FUROSEMIDE 10 MG/ML
40 INJECTION INTRAMUSCULAR; INTRAVENOUS EVERY 12 HOURS
Status: DISCONTINUED | OUTPATIENT
Start: 2019-01-13 | End: 2019-01-16

## 2019-01-13 RX ORDER — NITROGLYCERIN 40 MG/100ML
20 INJECTION INTRAVENOUS CONTINUOUS
Status: DISCONTINUED | OUTPATIENT
Start: 2019-01-13 | End: 2019-01-13

## 2019-01-13 RX ORDER — NALOXONE HYDROCHLORIDE 0.4 MG/ML
0.4 INJECTION, SOLUTION INTRAMUSCULAR; INTRAVENOUS; SUBCUTANEOUS AS NEEDED
Status: DISCONTINUED | OUTPATIENT
Start: 2019-01-13 | End: 2019-01-16 | Stop reason: HOSPADM

## 2019-01-13 RX ORDER — HEPARIN SODIUM 5000 [USP'U]/ML
5000 INJECTION, SOLUTION INTRAVENOUS; SUBCUTANEOUS EVERY 8 HOURS
Status: DISCONTINUED | OUTPATIENT
Start: 2019-01-13 | End: 2019-01-16 | Stop reason: HOSPADM

## 2019-01-13 RX ORDER — ATORVASTATIN CALCIUM 40 MG/1
40 TABLET, FILM COATED ORAL
Status: DISCONTINUED | OUTPATIENT
Start: 2019-01-13 | End: 2019-01-16 | Stop reason: HOSPADM

## 2019-01-13 RX ADMIN — HEPARIN SODIUM 5000 UNITS: 5000 INJECTION INTRAVENOUS; SUBCUTANEOUS at 21:04

## 2019-01-13 RX ADMIN — LABETALOL HYDROCHLORIDE 5 MG: 5 INJECTION, SOLUTION INTRAVENOUS at 21:04

## 2019-01-13 RX ADMIN — FUROSEMIDE 40 MG: 10 INJECTION, SOLUTION INTRAMUSCULAR; INTRAVENOUS at 22:49

## 2019-01-13 RX ADMIN — AMLODIPINE BESYLATE 10 MG: 10 TABLET ORAL at 21:03

## 2019-01-13 RX ADMIN — NITROGLYCERIN 50 MCG/MIN: 40 INJECTION INTRAVENOUS at 18:22

## 2019-01-13 RX ADMIN — FUROSEMIDE 80 MG: 10 INJECTION, SOLUTION INTRAVENOUS at 18:18

## 2019-01-13 RX ADMIN — LABETALOL HYDROCHLORIDE 5 MG: 5 INJECTION, SOLUTION INTRAVENOUS at 22:49

## 2019-01-13 RX ADMIN — NITROGLYCERIN 20 MCG/MIN: 40 INJECTION INTRAVENOUS at 19:59

## 2019-01-13 RX ADMIN — HYDRALAZINE HYDROCHLORIDE 25 MG: 25 TABLET, FILM COATED ORAL at 21:03

## 2019-01-13 RX ADMIN — ATORVASTATIN CALCIUM 40 MG: 40 TABLET, FILM COATED ORAL at 22:49

## 2019-01-13 RX ADMIN — HYDRALAZINE HYDROCHLORIDE 10 MG: 20 INJECTION INTRAMUSCULAR; INTRAVENOUS at 21:03

## 2019-01-13 NOTE — Clinical Note
Aspirin Registry Question:  
Aspirin was given and discussed with physician prior to the procedure. 20-Jul-2018 10:46

## 2019-01-13 NOTE — ED PROVIDER NOTES
EMERGENCY DEPARTMENT HISTORY AND PHYSICAL EXAM 
 
5:30 PM 
 
 
Date: 1/13/2019 Patient Name: Kannan Mejía History of Presenting Illness Chief Complaint Patient presents with  Shortness of Breath History Provided By: Patient Chief Complaint: shortness of breath Duration:  Days Timing:  Acute Location: chest 
 
Severity: Moderate Modifying Factors: worse when laying back Associated Symptoms: denies any other associated signs or symptoms Additional History (Context): Kannan Mejía is a 66 y.o. male with hypertension and CHF who presents with emergency room via EMS for worsening of Shortness of breath, without fever or cough of 5 days. PT reports being dx with CHF last visit, and was asked to get a stent/pacer but didn't want it due to fear of surgery. Meds are blood pressure and a water pill (lasix 40mg). Denies any recent infections, travel, fevers, reports he still smokes EF was 20% on echo in 5/22/2018. PCP: None Current Facility-Administered Medications Medication Dose Route Frequency Provider Last Rate Last Dose  furosemide (LASIX) injection 40 mg  40 mg IntraVENous Q12H Tiana Suarez MD      
 nitroglycerin (TRIDIL) 400 mcg/ml infusion  20 mcg/min IntraVENous CONTINUOUS Tiana Suarez MD      
 
Current Outpatient Medications Medication Sig Dispense Refill  losartan (COZAAR) 25 mg tablet Take 1 Tab by mouth daily. 30 Tab 2  
 metoprolol tartrate (LOPRESSOR) 50 mg tablet Take 1 Tab by mouth two (2) times a day. 60 Tab 6 Past History Past Medical History: 
Past Medical History:  
Diagnosis Date  Heart failure ([address]  Hypertension Past Surgical History: No past surgical history on file. Family History: No family history on file. Social History: 
Social History Tobacco Use  Smoking status: Current Some Day Smoker Packs/day: 0.25  Smokeless tobacco: Never Used Substance Use Topics  Alcohol use:  No  
 Frequency: Never  Drug use: No  
 
 
Allergies: 
No Known Allergies Review of Systems Review of Systems Constitutional: Positive for appetite change. Negative for fatigue and fever. HENT: Negative for congestion, ear discharge, ear pain, sinus pressure, sinus pain and sore throat. Eyes: Negative for pain, discharge and itching. Respiratory: Positive for cough, chest tightness and shortness of breath. Negative for wheezing and stridor. Cardiovascular: Positive for leg swelling. Negative for chest pain and palpitations. Gastrointestinal: Negative for abdominal distention, abdominal pain, constipation and vomiting. Endocrine: Negative for cold intolerance and heat intolerance. Genitourinary: Negative for difficulty urinating, dysuria and enuresis. Musculoskeletal: Negative for back pain and joint swelling. Skin: Negative for color change, pallor, rash and wound. Neurological: Positive for light-headedness. Psychiatric/Behavioral: The patient is nervous/anxious. Physical Exam  
 
Visit Vitals BP (!) 149/103 Pulse 96 Temp 98.2 °F (36.8 °C) Resp 9 Ht 5' 7\" (1.702 m) Wt 70.3 kg (155 lb) SpO2 99% BMI 24.28 kg/m² Physical Exam  
Constitutional: He is oriented to person, place, and time. Malnourished, talking if 4-5 word sentences, struggling to catch breath. AA male HENT:  
Head: Normocephalic and atraumatic. Right Ear: External ear normal.  
Left Ear: External ear normal.  
Nose: Nose normal.  
Mouth/Throat: Oropharynx is clear and moist.  
Poor dentition with multiple missing teeth Eyes: Conjunctivae and EOM are normal. Pupils are equal, round, and reactive to light. Neck: Normal range of motion. Neck supple. JVD present. No tracheal deviation present. Cardiovascular: Normal rate and normal heart sounds. An irregularly irregular rhythm present. Exam reveals no gallop and no friction rub. No murmur heard. Pulmonary/Chest: Accessory muscle usage present. Tachypnea noted. He is in respiratory distress. He has rales in the right upper field, the right lower field, the left upper field and the left lower field. Abdominal: Soft. Bowel sounds are normal. He exhibits no distension. There is no tenderness. There is no rebound. Some belly breathing Musculoskeletal: Normal range of motion. He exhibits edema. He exhibits no tenderness or deformity. 2+ pitting edema in B/l legs, extending to the knees. Neurological: He is alert and oriented to person, place, and time. He has normal reflexes. He displays normal reflexes. No cranial nerve deficit. He exhibits normal muscle tone. Coordination normal.  
Skin: Skin is warm and dry. No rash noted. No erythema. No pallor. Nursing note and vitals reviewed. Diagnostic Study Results Labs - Recent Results (from the past 12 hour(s)) CBC WITH AUTOMATED DIFF Collection Time: 01/13/19  5:45 PM  
Result Value Ref Range WBC 6.7 4.6 - 13.2 K/uL  
 RBC 4.15 (L) 4.70 - 5.50 M/uL  
 HGB 10.3 (L) 13.0 - 16.0 g/dL HCT 30.9 (L) 36.0 - 48.0 % MCV 74.5 74.0 - 97.0 FL  
 MCH 24.8 24.0 - 34.0 PG  
 MCHC 33.3 31.0 - 37.0 g/dL  
 RDW 18.0 (H) 11.6 - 14.5 % PLATELET 107 960 - 914 K/uL MPV 10.7 9.2 - 11.8 FL  
 NEUTROPHILS 73 40 - 73 % LYMPHOCYTES 21 21 - 52 % MONOCYTES 6 3 - 10 % EOSINOPHILS 0 0 - 5 % BASOPHILS 0 0 - 2 %  
 ABS. NEUTROPHILS 4.9 1.8 - 8.0 K/UL  
 ABS. LYMPHOCYTES 1.4 0.9 - 3.6 K/UL  
 ABS. MONOCYTES 0.4 0.05 - 1.2 K/UL  
 ABS. EOSINOPHILS 0.0 0.0 - 0.4 K/UL  
 ABS. BASOPHILS 0.0 0.0 - 0.1 K/UL  
 DF AUTOMATED PLATELET COMMENTS ADEQUATE PLATELETS    
PROTHROMBIN TIME + INR Collection Time: 01/13/19  5:45 PM  
Result Value Ref Range Prothrombin time 16.7 (H) 11.5 - 15.2 sec INR 1.4 (H) 0.8 - 1.2 NT-PRO BNP Collection Time: 01/13/19  5:45 PM  
Result Value Ref Range  NT pro-BNP 12,248 (H) 0 - 900 PG/ML  
 METABOLIC PANEL, COMPREHENSIVE Collection Time: 01/13/19  5:45 PM  
Result Value Ref Range Sodium 141 136 - 145 mmol/L Potassium 5.1 3.5 - 5.5 mmol/L Chloride 104 100 - 108 mmol/L  
 CO2 26 21 - 32 mmol/L Anion gap 11 3.0 - 18 mmol/L Glucose 108 (H) 74 - 99 mg/dL BUN 28 (H) 7.0 - 18 MG/DL Creatinine 1.92 (H) 0.6 - 1.3 MG/DL  
 BUN/Creatinine ratio 15 12 - 20 GFR est AA 44 (L) >60 ml/min/1.73m2 GFR est non-AA 36 (L) >60 ml/min/1.73m2 Calcium 8.7 8.5 - 10.1 MG/DL Bilirubin, total 2.4 (H) 0.2 - 1.0 MG/DL  
 ALT (SGPT) 123 (H) 16 - 61 U/L  
 AST (SGOT) 99 (H) 15 - 37 U/L Alk. phosphatase 100 45 - 117 U/L Protein, total 8.0 6.4 - 8.2 g/dL Albumin 3.0 (L) 3.4 - 5.0 g/dL Globulin 5.0 (H) 2.0 - 4.0 g/dL A-G Ratio 0.6 (L) 0.8 - 1.7 CARDIAC PANEL,(CK, CKMB & TROPONIN) Collection Time: 01/13/19  5:45 PM  
Result Value Ref Range  39 - 308 U/L  
 CK - MB 3.2 <3.6 ng/ml CK-MB Index 1.9 0.0 - 4.0 % Troponin-I, QT 0.31 (H) 0.0 - 8.170 NG/ML  
SALICYLATE Collection Time: 01/13/19  5:45 PM  
Result Value Ref Range Salicylate level <9.2 (L) 2.8 - 20.0 MG/DL POC LACTIC ACID Collection Time: 01/13/19  5:52 PM  
Result Value Ref Range Lactic Acid (POC) 3.15 (HH) 0.40 - 2.00 mmol/L  
EKG, 12 LEAD, INITIAL Collection Time: 01/13/19  6:21 PM  
Result Value Ref Range Ventricular Rate 97 BPM  
 Atrial Rate 97 BPM  
 P-R Interval 184 ms QRS Duration 112 ms  
 Q-T Interval 392 ms QTC Calculation (Bezet) 497 ms Calculated P Axis 57 degrees Calculated R Axis -2 degrees Calculated T Axis 78 degrees Diagnosis Normal sinus rhythm Possible Left atrial enlargement Nonspecific T wave abnormality Prolonged QT Abnormal ECG When compared with ECG of 25-MAR-2018 10:12, 
T wave inversion less evident in Lateral leads URINALYSIS W/ RFLX MICROSCOPIC Collection Time: 01/13/19  6:35 PM  
Result Value Ref Range Color DARK YELLOW Appearance CLEAR Specific gravity 1.014 1.005 - 1.030    
 pH (UA) 7.0 5.0 - 8.0 Protein 100 (A) NEG mg/dL Glucose NEGATIVE  NEG mg/dL Ketone NEGATIVE  NEG mg/dL Bilirubin NEGATIVE  NEG Blood NEGATIVE  NEG Urobilinogen >8.0 (H) 0.2 - 1.0 EU/dL Nitrites NEGATIVE  NEG Leukocyte Esterase TRACE (A) NEG URINE MICROSCOPIC ONLY Collection Time: 01/13/19  6:35 PM  
Result Value Ref Range WBC 4 to 8 0 - 4 /hpf  
 RBC NONE 0 - 5 /hpf Epithelial cells 1+ 0 - 5 /lpf Bacteria FEW (A) NEG /hpf Mucus FEW (A) NEG /lpf POC G3 Collection Time: 01/13/19  6:51 PM  
Result Value Ref Range Device: NASAL CANNULA Flow rate (POC) 2 L/M  
 pH (POC) 7.541 (H) 7.35 - 7.45    
 pCO2 (POC) 22.9 (L) 35.0 - 45.0 MMHG  
 pO2 (POC) 93 80 - 100 MMHG  
 HCO3 (POC) 19.6 (L) 22 - 26 MMOL/L  
 sO2 (POC) 98 (H) 92 - 97 % Base deficit (POC) 2 mmol/L Allens test (POC) YES Total resp. rate 25 Site RIGHT RADIAL Specimen type (POC) ARTERIAL Performed by Mat Mock Radiologic Studies -  
XR CHEST PORT    (Results Pending) Medical Decision Making I am the first provider for this patient. I reviewed the vital signs, available nursing notes, past medical history, past surgical history, family history and social history. Vital Signs-Reviewed the patient's vital signs. Pulse Oximetry Analysis -  98 on room air (Interpretation) Cardiac Monitor: 
Rate:  94 Rhythm: NSR 
 
EKG: Interpreted by the EP. Time Interpreted: 26 Rate: 97 Rhythm: Normal Sinus Rhythm Interpretation: long QTc 497, Comparison:  
 
Records Reviewed: Nursing Notes, Old Medical Records, Previous electrocardiograms, Previous Radiology Studies and Previous Laboratory Studies (Time of Review: 5:30 PM) ED Course: Progress Notes, Reevaluation, and Consults: 
0600 PM seen in room again, pt still elevated RR, started on nitro drip at 50, SPO2 98% on RA  
6:39 PM still elevated RR with nitro/lasix placed on 2L NC, will get venous gas.  
7:02 PM: Gas shows evidence of resp alkalosis, pt still has elevated work of breathing will attempt bipap Page to respiratory. Pt had elevation in trop, will repeat at 1000. 
7:44 PM Hospitalist will admit the patient to SDU.  
0800: PM hosp responded to cardiology about the patient. Provider Notes (Medical Decision Making):  
 
Procedures Critical Care Time: Critical Care Time: The services I provided to this patient were to treat and/or prevent clinically significant deterioration that could result in the failure of one or more body systems and/or organ systems due to  CHF and Shortness of breath. Services included the following: 
-reviewing nursing notes and old charts 
-vital sign assessments 
-direct patient care 
-medication orders and management 
-interpreting and reviewing diagnostic studies/labs 
-re-evaluations 
-documentation time Aggregate critical care time was 45 minutes, which includes only time during which I was engaged in work directly related to the patient's care as described above, whether I was at bedside or elsewhere in the Emergency Department. It did not include time spent performing other reported procedures or the services of residents, students, nurses, or advance practice providers. No att. providers found 7:09 PM 
 
 
For Hospitalized Patients: 
 
1. Hospitalization Decision Time: The decision to hospitalize the patient was made by Dr. Laure Wolf at [id number] on 1/13/2019 2. Aspirin: Aspirin was not given because the patient did not present with a stroke at the time of their Emergency Department evaluation Diagnosis Clinical Impression: 1. Acute on chronic systolic congestive heart failure ([facility] [address] 2. Acute kidney injury ([facility] 3. Essential hypertension Disposition: admitted Follow-up Information None Medication List  
  
 ASK your doctor about these medications   
losartan 25 mg tablet Commonly known as:  COZAAR Take 1 Tab by mouth daily. metoprolol tartrate 50 mg tablet Commonly known as:  LOPRESSOR Take 1 Tab by mouth two (2) times a day. 
  
  
 
_______________________________ As a resident physician all patient care information is shared with my attending physician. All History, Physical Exam, Laboratory and Imaging results, Medical Decision Making, and Disposition is discussed and approved by my attending. Don Celaya DO PGY-2 Emergency Medicine Resident 
 
 
_______________________________

## 2019-01-13 NOTE — Clinical Note
TRANSFER - OUT REPORT:  
 
Verbal report given to: Dolores FARLEY. Report consisted of patient's Situation, Background, Assessment and  
Recommendations(SBAR). Opportunity for questions and clarification was provided. Patient transported with a Cardiac Cath Tech / Patient Care Tech. Patient transported to: [address].

## 2019-01-13 NOTE — Clinical Note
Right groin and right radial clipped, prepped with ChloraPrep and draped. Wet prep solution applied at: 1056. Wet prep solution dried at: 1059. Wet prep elapsed drying time: 3 mins.

## 2019-01-13 NOTE — Clinical Note
Lesion: Located in the Proximal RCA. Stent inserted. Multiple inflations used. First inflation pressure = 13 ephraim; inflation time: 20 sec. Second inflation pressure: 16 ephraim; inflation time: 22 sec.  3.0x15 Xience Alba Inserted and deployed

## 2019-01-13 NOTE — Clinical Note
Lesion located in the Proximal RCA. Balloon inserted. Balloon inflated using multiple inflations inflation technique. Pressure = 8 ephraim; Duration = 9 sec. Inflation 2: Pressure: 14 ephraim; Duration: 15 sec.

## 2019-01-13 NOTE — Clinical Note
TRANSFER - IN REPORT:  
 
Verbal report received from: 1 Lovely Gertrude Judie. Report consisted of patient's Situation, Background, Assessment and  
Recommendations(SBAR). Opportunity for questions and clarification was provided. Assessment completed upon patient's arrival to unit and care assumed. Patient transported with a Cardiac Cath Tech / Patient Care Tech.

## 2019-01-13 NOTE — Clinical Note
Contrast Dose Calculator:  
Patient's age: 66.  
Patient's sex: Male. Patient weight (kg) = 69. Creatinine level (mg/dL) = 1.84. Creatinine clearance (mL/min): 43.75. Contrast concentration (mg/mL) = 320. MACD = 175.78 mL. Max Contrast dose per Creatinine Cl calculator = 98.44 mL.

## 2019-01-14 PROBLEM — E87.20 LACTIC ACIDEMIA: Status: ACTIVE | Noted: 2019-01-14

## 2019-01-14 LAB
ANION GAP SERPL CALC-SCNC: 9 MMOL/L (ref 3–18)
ATRIAL RATE: 97 BPM
BASOPHILS # BLD: 0 K/UL (ref 0–0.1)
BASOPHILS NFR BLD: 0 % (ref 0–2)
BUN SERPL-MCNC: 29 MG/DL (ref 7–18)
BUN/CREAT SERPL: 16 (ref 12–20)
CALCIUM SERPL-MCNC: 8.3 MG/DL (ref 8.5–10.1)
CALCULATED P AXIS, ECG09: 57 DEGREES
CALCULATED R AXIS, ECG10: -2 DEGREES
CALCULATED T AXIS, ECG11: 78 DEGREES
CHLORIDE SERPL-SCNC: 106 MMOL/L (ref 100–108)
CHOLEST SERPL-MCNC: 68 MG/DL
CK MB CFR SERPL CALC: 2.7 % (ref 0–4)
CK MB SERPL-MCNC: 2.7 NG/ML (ref 5–25)
CK SERPL-CCNC: 99 U/L (ref 39–308)
CO2 SERPL-SCNC: 26 MMOL/L (ref 21–32)
CREAT SERPL-MCNC: 1.84 MG/DL (ref 0.6–1.3)
DIAGNOSIS, 93000: NORMAL
DIFFERENTIAL METHOD BLD: ABNORMAL
END DIASTOLIC PRESSURE: 18
EOSINOPHIL # BLD: 0 K/UL (ref 0–0.4)
EOSINOPHIL NFR BLD: 0 % (ref 0–5)
ERYTHROCYTE [DISTWIDTH] IN BLOOD BY AUTOMATED COUNT: 17.9 % (ref 11.6–14.5)
GLUCOSE SERPL-MCNC: 149 MG/DL (ref 74–99)
HCT VFR BLD AUTO: 27.4 % (ref 36–48)
HDLC SERPL-MCNC: 20 MG/DL (ref 40–60)
HDLC SERPL: 3.4 {RATIO} (ref 0–5)
HGB BLD-MCNC: 9.2 G/DL (ref 13–16)
LACTATE SERPL-SCNC: 2.1 MMOL/L (ref 0.4–2)
LDLC SERPL CALC-MCNC: 36.4 MG/DL (ref 0–100)
LIPID PROFILE,FLP: ABNORMAL
LYMPHOCYTES # BLD: 1.4 K/UL (ref 0.9–3.6)
LYMPHOCYTES NFR BLD: 23 % (ref 21–52)
MAGNESIUM SERPL-MCNC: 2 MG/DL (ref 1.6–2.6)
MCH RBC QN AUTO: 24.8 PG (ref 24–34)
MCHC RBC AUTO-ENTMCNC: 33.6 G/DL (ref 31–37)
MCV RBC AUTO: 73.9 FL (ref 74–97)
MONOCYTES # BLD: 0.5 K/UL (ref 0.05–1.2)
MONOCYTES NFR BLD: 8 % (ref 3–10)
NEUTS SEG # BLD: 4 K/UL (ref 1.8–8)
NEUTS SEG NFR BLD: 69 % (ref 40–73)
P-R INTERVAL, ECG05: 184 MS
PHOSPHATE SERPL-MCNC: 3.9 MG/DL (ref 2.5–4.9)
PLATELET # BLD AUTO: 248 K/UL (ref 135–420)
PLATELET COMMENTS,PCOM: ABNORMAL
PMV BLD AUTO: 10.8 FL (ref 9.2–11.8)
POTASSIUM SERPL-SCNC: 3.5 MMOL/L (ref 3.5–5.5)
Q-T INTERVAL, ECG07: 392 MS
QRS DURATION, ECG06: 112 MS
QTC CALCULATION (BEZET), ECG08: 497 MS
RBC # BLD AUTO: 3.71 M/UL (ref 4.7–5.5)
RBC MORPH BLD: ABNORMAL
SODIUM SERPL-SCNC: 141 MMOL/L (ref 136–145)
TRIGL SERPL-MCNC: 58 MG/DL (ref ?–150)
TROPONIN I SERPL-MCNC: 0.32 NG/ML (ref 0–0.04)
VENTRICULAR RATE, ECG03: 97 BPM
VLDLC SERPL CALC-MCNC: 11.6 MG/DL
WBC # BLD AUTO: 5.9 K/UL (ref 4.6–13.2)

## 2019-01-14 PROCEDURE — 80048 BASIC METABOLIC PNL TOTAL CA: CPT

## 2019-01-14 PROCEDURE — 77030027845 HC BND COM RDL D-STAT TELE -B: Performed by: INTERNAL MEDICINE

## 2019-01-14 PROCEDURE — 82550 ASSAY OF CK (CPK): CPT

## 2019-01-14 PROCEDURE — 5A09457 ASSISTANCE WITH RESPIRATORY VENTILATION, 24-96 CONSECUTIVE HOURS, CONTINUOUS POSITIVE AIRWAY PRESSURE: ICD-10-PCS | Performed by: INTERNAL MEDICINE

## 2019-01-14 PROCEDURE — 36415 COLL VENOUS BLD VENIPUNCTURE: CPT

## 2019-01-14 PROCEDURE — 74011636320 HC RX REV CODE- 636/320: Performed by: INTERNAL MEDICINE

## 2019-01-14 PROCEDURE — 3E03317 INTRODUCTION OF OTHER THROMBOLYTIC INTO PERIPHERAL VEIN, PERCUTANEOUS APPROACH: ICD-10-PCS | Performed by: INTERNAL MEDICINE

## 2019-01-14 PROCEDURE — 77030028790 HC ACC ST CTRL VLV COPLT ABBT -B: Performed by: INTERNAL MEDICINE

## 2019-01-14 PROCEDURE — 74011000258 HC RX REV CODE- 258: Performed by: INTERNAL MEDICINE

## 2019-01-14 PROCEDURE — 77030013515 HC DEV INFL ANGI BSC -B: Performed by: INTERNAL MEDICINE

## 2019-01-14 PROCEDURE — C1725 CATH, TRANSLUMIN NON-LASER: HCPCS | Performed by: INTERNAL MEDICINE

## 2019-01-14 PROCEDURE — 99153 MOD SED SAME PHYS/QHP EA: CPT | Performed by: INTERNAL MEDICINE

## 2019-01-14 PROCEDURE — 74011000250 HC RX REV CODE- 250: Performed by: INTERNAL MEDICINE

## 2019-01-14 PROCEDURE — 4A023N7 MEASUREMENT OF CARDIAC SAMPLING AND PRESSURE, LEFT HEART, PERCUTANEOUS APPROACH: ICD-10-PCS | Performed by: INTERNAL MEDICINE

## 2019-01-14 PROCEDURE — 92928 PRQ TCAT PLMT NTRAC ST 1 LES: CPT | Performed by: INTERNAL MEDICINE

## 2019-01-14 PROCEDURE — 027034Z DILATION OF CORONARY ARTERY, ONE ARTERY WITH DRUG-ELUTING INTRALUMINAL DEVICE, PERCUTANEOUS APPROACH: ICD-10-PCS | Performed by: INTERNAL MEDICINE

## 2019-01-14 PROCEDURE — C1769 GUIDE WIRE: HCPCS | Performed by: INTERNAL MEDICINE

## 2019-01-14 PROCEDURE — 83605 ASSAY OF LACTIC ACID: CPT

## 2019-01-14 PROCEDURE — 74011250636 HC RX REV CODE- 250/636: Performed by: INTERNAL MEDICINE

## 2019-01-14 PROCEDURE — C1894 INTRO/SHEATH, NON-LASER: HCPCS | Performed by: INTERNAL MEDICINE

## 2019-01-14 PROCEDURE — B2151ZZ FLUOROSCOPY OF LEFT HEART USING LOW OSMOLAR CONTRAST: ICD-10-PCS | Performed by: INTERNAL MEDICINE

## 2019-01-14 PROCEDURE — 74011250637 HC RX REV CODE- 250/637: Performed by: INTERNAL MEDICINE

## 2019-01-14 PROCEDURE — 85025 COMPLETE CBC W/AUTO DIFF WBC: CPT

## 2019-01-14 PROCEDURE — 80061 LIPID PANEL: CPT

## 2019-01-14 PROCEDURE — 77030015766: Performed by: INTERNAL MEDICINE

## 2019-01-14 PROCEDURE — B2111ZZ FLUOROSCOPY OF MULTIPLE CORONARY ARTERIES USING LOW OSMOLAR CONTRAST: ICD-10-PCS | Performed by: INTERNAL MEDICINE

## 2019-01-14 PROCEDURE — 65660000004 HC RM CVT STEPDOWN

## 2019-01-14 PROCEDURE — 74011250636 HC RX REV CODE- 250/636

## 2019-01-14 PROCEDURE — 83735 ASSAY OF MAGNESIUM: CPT

## 2019-01-14 PROCEDURE — 77030013797 HC KT TRNSDUC PRSSR EDWD -A: Performed by: INTERNAL MEDICINE

## 2019-01-14 PROCEDURE — 99152 MOD SED SAME PHYS/QHP 5/>YRS: CPT | Performed by: INTERNAL MEDICINE

## 2019-01-14 PROCEDURE — 84100 ASSAY OF PHOSPHORUS: CPT

## 2019-01-14 PROCEDURE — 93458 L HRT ARTERY/VENTRICLE ANGIO: CPT | Performed by: INTERNAL MEDICINE

## 2019-01-14 PROCEDURE — C1887 CATHETER, GUIDING: HCPCS | Performed by: INTERNAL MEDICINE

## 2019-01-14 DEVICE — XIENCE SIERRA™ EVEROLIMUS ELUTING CORONARY STENT SYSTEM 3.00 MM X 15 MM / RAPID-EXCHANGE
Type: IMPLANTABLE DEVICE | Status: FUNCTIONAL
Brand: XIENCE SIERRA™

## 2019-01-14 RX ORDER — FUROSEMIDE 40 MG/1
TABLET ORAL DAILY
Status: ON HOLD | COMMUNITY
End: 2019-01-16 | Stop reason: SDUPTHER

## 2019-01-14 RX ORDER — ASPIRIN 325 MG
325 TABLET ORAL DAILY
COMMUNITY
End: 2019-01-16

## 2019-01-14 RX ORDER — SODIUM CHLORIDE 450 MG/100ML
150 INJECTION, SOLUTION INTRAVENOUS CONTINUOUS
Status: DISPENSED | OUTPATIENT
Start: 2019-01-14 | End: 2019-01-14

## 2019-01-14 RX ORDER — VERAPAMIL HYDROCHLORIDE 2.5 MG/ML
INJECTION, SOLUTION INTRAVENOUS AS NEEDED
Status: DISCONTINUED | OUTPATIENT
Start: 2019-01-14 | End: 2019-01-14 | Stop reason: HOSPADM

## 2019-01-14 RX ORDER — NITROGLYCERIN 400 UG/1
1 SPRAY ORAL
Status: ACTIVE | OUTPATIENT
Start: 2019-01-14 | End: 2019-01-15

## 2019-01-14 RX ORDER — HEPARIN SODIUM 1000 [USP'U]/ML
INJECTION, SOLUTION INTRAVENOUS; SUBCUTANEOUS AS NEEDED
Status: DISCONTINUED | OUTPATIENT
Start: 2019-01-14 | End: 2019-01-14 | Stop reason: HOSPADM

## 2019-01-14 RX ORDER — FENTANYL CITRATE 50 UG/ML
INJECTION, SOLUTION INTRAMUSCULAR; INTRAVENOUS AS NEEDED
Status: DISCONTINUED | OUTPATIENT
Start: 2019-01-14 | End: 2019-01-14 | Stop reason: HOSPADM

## 2019-01-14 RX ORDER — IODIXANOL 320 MG/ML
INJECTION, SOLUTION INTRAVASCULAR AS NEEDED
Status: DISCONTINUED | OUTPATIENT
Start: 2019-01-14 | End: 2019-01-14 | Stop reason: HOSPADM

## 2019-01-14 RX ORDER — MIDAZOLAM HYDROCHLORIDE 1 MG/ML
INJECTION, SOLUTION INTRAMUSCULAR; INTRAVENOUS AS NEEDED
Status: DISCONTINUED | OUTPATIENT
Start: 2019-01-14 | End: 2019-01-14 | Stop reason: HOSPADM

## 2019-01-14 RX ORDER — SODIUM CHLORIDE 0.9 % (FLUSH) 0.9 %
5-40 SYRINGE (ML) INJECTION EVERY 8 HOURS
Status: DISCONTINUED | OUTPATIENT
Start: 2019-01-14 | End: 2019-01-16 | Stop reason: HOSPADM

## 2019-01-14 RX ORDER — LABETALOL 200 MG/1
200 TABLET, FILM COATED ORAL EVERY 12 HOURS
Status: DISCONTINUED | OUTPATIENT
Start: 2019-01-14 | End: 2019-01-15

## 2019-01-14 RX ORDER — POTASSIUM CHLORIDE 20 MEQ/1
40 TABLET, EXTENDED RELEASE ORAL DAILY
Status: COMPLETED | OUTPATIENT
Start: 2019-01-14 | End: 2019-01-15

## 2019-01-14 RX ORDER — BIVALIRUDIN 250 MG/5ML
INJECTION, POWDER, LYOPHILIZED, FOR SOLUTION INTRAVENOUS AS NEEDED
Status: DISCONTINUED | OUTPATIENT
Start: 2019-01-14 | End: 2019-01-14 | Stop reason: HOSPADM

## 2019-01-14 RX ORDER — LIDOCAINE HYDROCHLORIDE 10 MG/ML
INJECTION, SOLUTION EPIDURAL; INFILTRATION; INTRACAUDAL; PERINEURAL AS NEEDED
Status: DISCONTINUED | OUTPATIENT
Start: 2019-01-14 | End: 2019-01-14 | Stop reason: HOSPADM

## 2019-01-14 RX ORDER — SODIUM CHLORIDE 0.9 % (FLUSH) 0.9 %
5-40 SYRINGE (ML) INJECTION AS NEEDED
Status: DISCONTINUED | OUTPATIENT
Start: 2019-01-14 | End: 2019-01-16 | Stop reason: HOSPADM

## 2019-01-14 RX ORDER — ASPIRIN 81 MG/1
81 TABLET ORAL DAILY
Status: DISCONTINUED | OUTPATIENT
Start: 2019-01-15 | End: 2019-01-14

## 2019-01-14 RX ADMIN — Medication 10 ML: at 14:00

## 2019-01-14 RX ADMIN — ATORVASTATIN CALCIUM 40 MG: 40 TABLET, FILM COATED ORAL at 21:43

## 2019-01-14 RX ADMIN — HEPARIN SODIUM 5000 UNITS: 5000 INJECTION INTRAVENOUS; SUBCUTANEOUS at 04:07

## 2019-01-14 RX ADMIN — HEPARIN SODIUM 5000 UNITS: 5000 INJECTION INTRAVENOUS; SUBCUTANEOUS at 21:43

## 2019-01-14 RX ADMIN — FUROSEMIDE 40 MG: 10 INJECTION, SOLUTION INTRAMUSCULAR; INTRAVENOUS at 17:47

## 2019-01-14 RX ADMIN — LABETALOL HYDROCHLORIDE 200 MG: 100 TABLET, FILM COATED ORAL at 09:00

## 2019-01-14 RX ADMIN — FUROSEMIDE 40 MG: 10 INJECTION, SOLUTION INTRAMUSCULAR; INTRAVENOUS at 09:00

## 2019-01-14 RX ADMIN — ISOSORBIDE MONONITRATE 30 MG: 30 TABLET, EXTENDED RELEASE ORAL at 09:00

## 2019-01-14 RX ADMIN — LABETALOL HYDROCHLORIDE 200 MG: 100 TABLET, FILM COATED ORAL at 04:07

## 2019-01-14 RX ADMIN — ASPIRIN 81 MG CHEWABLE TABLET 81 MG: 81 TABLET CHEWABLE at 09:00

## 2019-01-14 RX ADMIN — HYDRALAZINE HYDROCHLORIDE 25 MG: 25 TABLET, FILM COATED ORAL at 09:00

## 2019-01-14 RX ADMIN — SODIUM CHLORIDE 150 ML: 450 INJECTION, SOLUTION INTRAVENOUS at 12:34

## 2019-01-14 RX ADMIN — POTASSIUM CHLORIDE 40 MEQ: 20 TABLET, EXTENDED RELEASE ORAL at 17:47

## 2019-01-14 RX ADMIN — Medication 10 ML: at 21:43

## 2019-01-14 RX ADMIN — HYDRALAZINE HYDROCHLORIDE 25 MG: 25 TABLET, FILM COATED ORAL at 21:43

## 2019-01-14 RX ADMIN — TICAGRELOR 180 MG: 90 TABLET ORAL at 11:51

## 2019-01-14 NOTE — PROGRESS NOTES
NUTRITION Nursing Referral: [facility] 
  
RECOMMENDATIONS / PLAN:  
 
- Resume diet as medically appropriate following procedure. - Continue RD inpatient monitoring and evaluation. NUTRITION INTERVENTIONS & DIAGNOSIS:  
 
[] Meals/snacks: modify composition: NPO Nutrition Diagnosis: Unintended weight loss related to predicted inadequate energy intake as evidenced by pt with a 10 lb (6.1%) weight loss x 6 months PTA per chart review. ASSESSMENT:  
 
Admitted with CHF and NPO for cardiac cath today. Reports some unplanned weight loss x several months PTA with decreased meal consumption due to his wife being ill and caring for her over the past several months. Reports he has been putting her needs first and has not been eating as much as normal. Pt currently with good appetite and meal intake. Pt receptive to cardiac diet discussion. Average po intake adequate to meet patients estimated nutritional needs:   [] Yes     [x] No   [] Unable to determine at this time Diet: DIET CARDIAC Regular Food Allergies: NKFA Current Appetite:   [x] Good     [] Fair     [] Poor     [] Other: 
Appetite/meal intake prior to admission:   [] Good     [x] Fair x several months     [] Poor     [] Other: 
Feeding Limitations:  [] Swallowing difficulty    [] Chewing difficulty    [] Other: 
Current Meal Intake: No data found. BM: 1/13 Skin Integrity: WDL Edema:   [] No     [x] Yes Pertinent Medications: Reviewed: 1/2 NS at 150 mL/hr, colace, lasix, zofran, 40 mEq KCL Recent Labs  
  01/14/19 
0446 01/13/19 
1745  141  
K 3.5 5.1  104 CO2 26 26 * 108* BUN 29* 28* CREA 1.84* 1.92* CA 8.3* 8.7 MG 2.0  --   
PHOS 3.9  --   
ALB  --  3.0*  
SGOT  --  99* ALT  --  123* Intake/Output Summary (Last 24 hours) at 1/14/2019 1243 Last data filed at 1/14/2019 5136 Gross per 24 hour Intake 239.1 ml Output 3450 ml Net -3210.9 ml Anthropometrics: Ht Readings from Last 1 Encounters:  
01/13/19 5' 7\" (1.702 m) Last 3 Recorded Weights in this Encounter 01/13/19 1720 01/14/19 0409 Weight: 70.3 kg (155 lb) 69 kg (152 lb 1.9 oz) Body mass index is 23.82 kg/m². Weight History: Pt reports a UBW of 160 lbs a few months ago. Per chart hx review pt with a 10 lb (6.1%) weight loss x 6 months PTA Weight Metrics 1/14/2019 7/26/2018 Weight 152 lb 1.9 oz 162 lb BMI 23.82 kg/m2 25.37 kg/m2 Admitting Diagnosis: CHF (congestive heart failure) ([facility] [address] Pertinent PMHx: HTN, heart failure Education Needs:        [x] None identified  [] Identified - Not appropriate at this time  []  Identified and addressed - refer to education log Learning Limitations:   [x] None identified  [] Identified Cultural, Taoism & ethnic food preferences:  [x] None identified    [] Identified and addressed ESTIMATED NUTRITION NEEDS:  
 
Calories: 6512-2340 kcal (MSJx1.2-1.3) based on  [x] Actual BW: 69 kg      [] IBW Protein: 55-83 gm (1-1.2 gm/kg) based on  [x] Actual BW      [] IBW Fluid: 1 mL/kcal 
  
MONITORING & EVALUATION:  
 
Nutrition Goal(s): 1. Po intake of meals will meet >75% of patient estimated nutritional needs within the next 7 days. Outcome:  [] Met/Ongoing    []  Not Met    [x] New/Initial Goal  
 
Monitoring:   [x] Food and beverage intake   [x] Diet order   [x] Nutrition-focused physical findings   [x] Treatment/therapy   [] Weight   [] Enteral nutrition intake Previous Recommendations (for follow-up assessments only):     []   Implemented       []   Not Implemented (RD to address)      [] No Longer Appropriate     [] No Recommendation Made Discharge Planning: cardiac diet [x] Participated in care planning, discharge planning, & interdisciplinary rounds as appropriate Lani Vizcarra, RD Pager: [phone]

## 2019-01-14 NOTE — ED NOTES
TRANSFER - OUT REPORT: 
 
Verbal report given to Falguni Antony RN (name) on Rosa Isela Diggs  being transferred to  (unit) for routine progression of care Report consisted of patients Situation, Background, Assessment and  
Recommendations(SBAR). Information from the following report(s) SBAR, ED Summary, Intake/Output and MAR was reviewed with the receiving nurse. Lines:  
Peripheral IV 01/13/19 Left Antecubital (Active) Site Assessment Clean, dry, & intact 1/13/2019  5:45 PM  
Phlebitis Assessment 0 1/13/2019  5:45 PM  
Infiltration Assessment 0 1/13/2019  5:45 PM  
Dressing Status Clean, dry, & intact 1/13/2019  5:45 PM  
Dressing Type Transparent;Tape 1/13/2019  5:45 PM  
Hub Color/Line Status Green;Patent; Flushed 1/13/2019  5:45 PM  
Action Taken Blood drawn 1/13/2019  5:45 PM  
Alcohol Cap Used No 1/13/2019  5:45 PM  
   
Peripheral IV 01/13/19 Right Wrist (Active) Site Assessment Clean, dry, & intact 1/13/2019  9:55 PM  
Phlebitis Assessment 0 1/13/2019  9:55 PM  
Infiltration Assessment 0 1/13/2019  9:55 PM  
Dressing Status Clean, dry, & intact 1/13/2019  9:55 PM  
Dressing Type Transparent 1/13/2019  9:55 PM  
Hub Color/Line Status Pink;Flushed;Patent 1/13/2019  9:55 PM  
  
 
Opportunity for questions and clarification was provided. Patient transported with: 
 O2 @ 4 liters

## 2019-01-14 NOTE — ROUTINE PROCESS
TRANSFER - IN REPORT: 
 
Verbal report received from MIGUELITO Pike(name) on Jayant Pathak  being received from ER(unit) for routine progression of care Report consisted of patients Situation, Background, Assessment and  
Recommendations(SBAR). Information from the following report(s) SBAR, Kardex, ED Summary, Intake/Output, MAR, Recent Results and Cardiac Rhythm SR was reviewed with the receiving nurse. Opportunity for questions and clarification was provided. Assessment completed upon patients arrival to unit and care assumed. Primary Nurse Amanda Clemons RN and MIGUELITO Dobbins performed a dual skin assessment on this patient No impairment noted Chad score is 21 Bedside and Verbal shift change report given to [facility] (oncoming nurse) by me (offgoing nurse).  Report included the following information SBAR, Kardex, ED Summary, Intake/Output, MAR, Recent Results and Cardiac Rhythm SR.

## 2019-01-14 NOTE — PROGRESS NOTES
Pt c/o SOB. Pt post cardiac cath and received Brilinta but also admitted with CHF. P.A called and made aware. Pt given IV lasix pm dose and p.o. Potassium not given this am.  Pt reassured that s/s could be side effect of medication received.

## 2019-01-14 NOTE — PROGRESS NOTES
conducted an initial consultation and Spiritual Assessment for Hudson Cardozo, who is a 66 y.o.,male. Patients Primary Language is: [place]. According to the patients EMR Sikh Affiliation is: Non Mosque.  
 
The reason the Patient came to the hospital is:  
Patient Active Problem List  
 Diagnosis Date Noted  Lactic acidemia 01/14/2019  CHF (congestive heart failure) ([facility] [address] 01/13/2019  Acute on chronic combined systolic and diastolic congestive heart failure ([facility] [address] 01/13/2019  Tobacco abuse 01/13/2019  Acute renal failure (ARF) ([facility] [address] 01/13/2019  Non-compliance 01/13/2019  Acute pulmonary edema ([facility] 01/13/2019  Acute respiratory distress 01/13/2019  Accelerated hypertension 01/13/2019 The  provided the following Interventions: 
Initiated a relationship of care and support. Explored issues of yvonne, belief, spirituality and Shinto/ritual needs while hospitalized. Provided information about Spiritual Care Services. Offered prayer and assurance of continued prayers on patient's behalf. Chart reviewed. The following outcomes where achieved: 
 confirmed Patient's Sikh Affiliation. Patient expressed gratitude for 's visit. Assessment: 
Patient's yvonne in God is very important for him to cope. Patient does not have any Shinto/cultural needs that will affect patients preferences in health care. There are no spiritual or Shinto issues which require intervention at this time. Plan: 
Chaplains will continue to follow and will provide pastoral care on an as needed/requested basis.  recommends bedside caregivers page  on duty if patient shows signs of acute spiritual or emotional distress. [facility] Spiritual Care 
([phone])

## 2019-01-14 NOTE — PROGRESS NOTES
Problem: Falls - Risk of 
Goal: *Absence of Falls Document Codyo Horn Fall Risk and appropriate interventions in the flowsheet. Outcome: Progressing Towards Goal 
Fall Risk Interventions: 
  
 
  
 
Medication Interventions: Bed/chair exit alarm, Teach patient to arise slowly, Patient to call before getting OOB, Evaluate medications/consider consulting pharmacy

## 2019-01-14 NOTE — PROGRESS NOTES
[facility] Patient: Rosa Isela Diggs Age: 66 y.o. : 1960 MR#: [id number] SSN: [id number] Date/Time: 2019 Subjective:  
Patient in NAD, seen in cath holding, denies cp or sob. Wife at bedside Assessment/Plan:  
 
- acute on chronic systolic chf exac - CAD 
- Tobacco abuse - Hypertensive Urgency at admission 
- h/o noncompliance - LEV PLAN 
S/p pci and stent On DAPT Iv lasix, I/O Smoking cessation education Monitor renal function Counseled compliance D/w patient Case discussed with:  [x]Patient  [x]Family  []Nursing  []Case Management DVT Prophylaxis:  []Lovenox  []Hep SQ  []SCDs  []Coumadin   []On Heparin gtt Objective:  
VS:  
Visit Vitals /81 Pulse 70 Temp 97.1 °F (36.2 °C) Resp 19 Ht 5' 7\" (1.702 m) Wt 69 kg (152 lb 1.9 oz) SpO2 100% BMI 23.82 kg/m² Tmax/24hrs: Temp (24hrs), Av.8 °F (36.6 °C), Min:97.1 °F (36.2 °C), Max:98.9 °F (37.2 °C) Input/Output:  
 
Intake/Output Summary (Last 24 hours) at 2019 1510 Last data filed at 2019 1234 Gross per 24 hour Intake 389.1 ml Output 4990 ml Net -4600.9 ml  
 
 
General:  Awake, alert Cardiovascular:  S1S2+, RRR Pulmonary:  Decreased BS b/l Bases GI:  Soft, BS+, NT, ND Extremities:  No edema Labs:   
Recent Results (from the past 24 hour(s)) CBC WITH AUTOMATED DIFF Collection Time: 19  5:45 PM  
Result Value Ref Range WBC 6.7 4.6 - 13.2 K/uL  
 RBC 4.15 (L) 4.70 - 5.50 M/uL  
 HGB 10.3 (L) 13.0 - 16.0 g/dL HCT 30.9 (L) 36.0 - 48.0 % MCV 74.5 74.0 - 97.0 FL  
 MCH 24.8 24.0 - 34.0 PG  
 MCHC 33.3 31.0 - 37.0 g/dL  
 RDW 18.0 (H) 11.6 - 14.5 % PLATELET 296 122 - 211 K/uL MPV 10.7 9.2 - 11.8 FL  
 NEUTROPHILS 73 40 - 73 % LYMPHOCYTES 21 21 - 52 % MONOCYTES 6 3 - 10 % EOSINOPHILS 0 0 - 5 % BASOPHILS 0 0 - 2 %  
 ABS. NEUTROPHILS 4.9 1.8 - 8.0 K/UL ABS. LYMPHOCYTES 1.4 0.9 - 3.6 K/UL  
 ABS. MONOCYTES 0.4 0.05 - 1.2 K/UL  
 ABS. EOSINOPHILS 0.0 0.0 - 0.4 K/UL  
 ABS. BASOPHILS 0.0 0.0 - 0.1 K/UL  
 DF AUTOMATED PLATELET COMMENTS ADEQUATE PLATELETS    
PROTHROMBIN TIME + INR Collection Time: 01/13/19  5:45 PM  
Result Value Ref Range Prothrombin time 16.7 (H) 11.5 - 15.2 sec INR 1.4 (H) 0.8 - 1.2 NT-PRO BNP Collection Time: 01/13/19  5:45 PM  
Result Value Ref Range NT pro-BNP 12,248 (H) 0 - 936 PG/ML  
METABOLIC PANEL, COMPREHENSIVE Collection Time: 01/13/19  5:45 PM  
Result Value Ref Range Sodium 141 136 - 145 mmol/L Potassium 5.1 3.5 - 5.5 mmol/L Chloride 104 100 - 108 mmol/L  
 CO2 26 21 - 32 mmol/L Anion gap 11 3.0 - 18 mmol/L Glucose 108 (H) 74 - 99 mg/dL BUN 28 (H) 7.0 - 18 MG/DL Creatinine 1.92 (H) 0.6 - 1.3 MG/DL  
 BUN/Creatinine ratio 15 12 - 20 GFR est AA 44 (L) >60 ml/min/1.73m2 GFR est non-AA 36 (L) >60 ml/min/1.73m2 Calcium 8.7 8.5 - 10.1 MG/DL Bilirubin, total 2.4 (H) 0.2 - 1.0 MG/DL  
 ALT (SGPT) 123 (H) 16 - 61 U/L  
 AST (SGOT) 99 (H) 15 - 37 U/L Alk. phosphatase 100 45 - 117 U/L Protein, total 8.0 6.4 - 8.2 g/dL Albumin 3.0 (L) 3.4 - 5.0 g/dL Globulin 5.0 (H) 2.0 - 4.0 g/dL A-G Ratio 0.6 (L) 0.8 - 1.7 CARDIAC PANEL,(CK, CKMB & TROPONIN) Collection Time: 01/13/19  5:45 PM  
Result Value Ref Range  39 - 308 U/L  
 CK - MB 3.2 <3.6 ng/ml CK-MB Index 1.9 0.0 - 4.0 % Troponin-I, QT 0.31 (H) 0.0 - 9.689 NG/ML  
SALICYLATE Collection Time: 01/13/19  5:45 PM  
Result Value Ref Range Salicylate level <8.1 (L) 2.8 - 20.0 MG/DL  
CULTURE, BLOOD Collection Time: 01/13/19  5:45 PM  
Result Value Ref Range Special Requests: NO SPECIAL REQUESTS Culture result: NO GROWTH AFTER 10 HOURS    
POC LACTIC ACID Collection Time: 01/13/19  5:52 PM  
Result Value Ref Range Lactic Acid (POC) 3.15 (HH) 0.40 - 2.00 mmol/L  
CULTURE, BLOOD Collection Time: 01/13/19  6:00 PM  
Result Value Ref Range Special Requests: NO SPECIAL REQUESTS Culture result: NO GROWTH AFTER 13 HOURS    
EKG, 12 LEAD, INITIAL Collection Time: 01/13/19  6:21 PM  
Result Value Ref Range Ventricular Rate 97 BPM  
 Atrial Rate 97 BPM  
 P-R Interval 184 ms QRS Duration 112 ms  
 Q-T Interval 392 ms QTC Calculation (Bezet) 497 ms Calculated P Axis 57 degrees Calculated R Axis -2 degrees Calculated T Axis 78 degrees Diagnosis Normal sinus rhythm Possible Left atrial enlargement Nonspecific T wave abnormality Prolonged QT Abnormal ECG When compared with ECG of 25-MAR-2018 10:12, 
T wave inversion less evident in Lateral leads Confirmed by Melonie Mcbride MD, --- ([id number]) on 1/14/2019 11:06:13 AM 
  
URINALYSIS W/ RFLX MICROSCOPIC Collection Time: 01/13/19  6:35 PM  
Result Value Ref Range Color DARK YELLOW Appearance CLEAR Specific gravity 1.014 1.005 - 1.030    
 pH (UA) 7.0 5.0 - 8.0 Protein 100 (A) NEG mg/dL Glucose NEGATIVE  NEG mg/dL Ketone NEGATIVE  NEG mg/dL Bilirubin NEGATIVE  NEG Blood NEGATIVE  NEG Urobilinogen >8.0 (H) 0.2 - 1.0 EU/dL Nitrites NEGATIVE  NEG Leukocyte Esterase TRACE (A) NEG URINE MICROSCOPIC ONLY Collection Time: 01/13/19  6:35 PM  
Result Value Ref Range WBC 4 to 8 0 - 4 /hpf  
 RBC NONE 0 - 5 /hpf Epithelial cells 1+ 0 - 5 /lpf Bacteria FEW (A) NEG /hpf Mucus FEW (A) NEG /lpf POC G3 Collection Time: 01/13/19  6:51 PM  
Result Value Ref Range Device: NASAL CANNULA Flow rate (POC) 2 L/M  
 pH (POC) 7.541 (H) 7.35 - 7.45    
 pCO2 (POC) 22.9 (L) 35.0 - 45.0 MMHG  
 pO2 (POC) 93 80 - 100 MMHG  
 HCO3 (POC) 19.6 (L) 22 - 26 MMOL/L  
 sO2 (POC) 98 (H) 92 - 97 % Base deficit (POC) 2 mmol/L Allens test (POC) YES Total resp. rate 25 Site RIGHT RADIAL Specimen type (POC) ARTERIAL Performed by Monse Albrecht TROPONIN I Collection Time: 01/13/19  9:49 PM  
Result Value Ref Range Troponin-I, QT 0.33 (H) 0.0 - 0.045 NG/ML  
POC LACTIC ACID Collection Time: 01/13/19  9:55 PM  
Result Value Ref Range Lactic Acid (POC) 2.75 (HH) 0.40 - 2.00 mmol/L  
POC G3 Collection Time: 01/13/19 11:07 PM  
Result Value Ref Range Device: BIPAP    
 FIO2 (POC) 45 % pH (POC) 7.500 (H) 7.35 - 7.45    
 pCO2 (POC) 30.4 (L) 35.0 - 45.0 MMHG  
 pO2 (POC) 181 (H) 80 - 100 MMHG  
 HCO3 (POC) 23.7 22 - 26 MMOL/L  
 sO2 (POC) 100 (H) 92 - 97 % Base excess (POC) 1 mmol/L  
 PEEP/CPAP (POC) 5 cmH2O  
 PIP (POC) 12    
 Pressure support 7 cmH2O Allens test (POC) YES Total resp. rate 24 Site LEFT RADIAL Patient temp. 37.0 Specimen type (POC) ARTERIAL Performed by Dianne Cuadra timed YES    
METABOLIC PANEL, BASIC Collection Time: 01/14/19  4:46 AM  
Result Value Ref Range Sodium 141 136 - 145 mmol/L Potassium 3.5 3.5 - 5.5 mmol/L Chloride 106 100 - 108 mmol/L  
 CO2 26 21 - 32 mmol/L Anion gap 9 3.0 - 18 mmol/L Glucose 149 (H) 74 - 99 mg/dL BUN 29 (H) 7.0 - 18 MG/DL Creatinine 1.84 (H) 0.6 - 1.3 MG/DL  
 BUN/Creatinine ratio 16 12 - 20 GFR est AA 46 (L) >60 ml/min/1.73m2 GFR est non-AA 38 (L) >60 ml/min/1.73m2 Calcium 8.3 (L) 8.5 - 10.1 MG/DL MAGNESIUM Collection Time: 01/14/19  4:46 AM  
Result Value Ref Range Magnesium 2.0 1.6 - 2.6 mg/dL PHOSPHORUS Collection Time: 01/14/19  4:46 AM  
Result Value Ref Range Phosphorus 3.9 2.5 - 4.9 MG/DL  
CBC WITH AUTOMATED DIFF Collection Time: 01/14/19  4:46 AM  
Result Value Ref Range WBC 5.9 4.6 - 13.2 K/uL  
 RBC 3.71 (L) 4.70 - 5.50 M/uL HGB 9.2 (L) 13.0 - 16.0 g/dL HCT 27.4 (L) 36.0 - 48.0 % MCV 73.9 (L) 74.0 - 97.0 FL  
 MCH 24.8 24.0 - 34.0 PG  
 MCHC 33.6 31.0 - 37.0 g/dL  
 RDW 17.9 (H) 11.6 - 14.5 % PLATELET 313 384 - 228 K/uL  MPV 10.8 9.2 - 11.8 FL  
 NEUTROPHILS 69 40 - 73 % LYMPHOCYTES 23 21 - 52 % MONOCYTES 8 3 - 10 % EOSINOPHILS 0 0 - 5 % BASOPHILS 0 0 - 2 %  
 ABS. NEUTROPHILS 4.0 1.8 - 8.0 K/UL  
 ABS. LYMPHOCYTES 1.4 0.9 - 3.6 K/UL  
 ABS. MONOCYTES 0.5 0.05 - 1.2 K/UL  
 ABS. EOSINOPHILS 0.0 0.0 - 0.4 K/UL  
 ABS. BASOPHILS 0.0 0.0 - 0.1 K/UL  
 DF AUTOMATED PLATELET COMMENTS ADEQUATE PLATELETS    
 RBC COMMENTS HYPOCHROMIA 2+ 
    
 RBC COMMENTS TARGET CELLS 2+ 
    
 RBC COMMENTS MICROCYTOSIS 1+ 
    
 RBC COMMENTS ANISOCYTOSIS 
1+ CARDIAC PANEL,(CK, CKMB & TROPONIN) Collection Time: 01/14/19  4:46 AM  
Result Value Ref Range CK 99 39 - 308 U/L  
 CK - MB 2.7 <3.6 ng/ml CK-MB Index 2.7 0.0 - 4.0 % Troponin-I, QT 0.32 (H) 0.0 - 0.045 NG/ML  
LIPID PANEL Collection Time: 01/14/19  4:46 AM  
Result Value Ref Range LIPID PROFILE Cholesterol, total 68 <200 MG/DL Triglyceride 58 <150 MG/DL  
 HDL Cholesterol 20 (L) 40 - 60 MG/DL  
 LDL, calculated 36.4 0 - 100 MG/DL VLDL, calculated 11.6 MG/DL  
 CHOL/HDL Ratio 3.4 0 - 5.0 LACTIC ACID Collection Time: 01/14/19  4:46 AM  
Result Value Ref Range Lactic acid 2.1 (HH) 0.4 - 2.0 MMOL/L  
CARDIAC PROCEDURE Collection Time: 01/14/19 11:58 AM  
Result Value Ref Range EDP 18 Additional Data Reviewed:   
 
Signed By: Simone Grove MD   
 January 14, 2019 3:10 PM

## 2019-01-14 NOTE — CONSULTS
[facility] - Consult Note Consultation request by Wolf Fontanez MD for advice/opinion related to evaluating CHF (congestive heart failure) ([email or web address] Date of  Admission: 1/13/2019  5:16 PM  
Primary Care Physician:  None Assessment:  
 
Patient Active Problem List  
Diagnosis Code  CHF (congestive heart failure) ([facility]) I50.9  Acute on chronic combined systolic and diastolic congestive heart failure ([facility]) I50.43  Tobacco abuse Z72.0  Acute renal failure (ARF) ([facility]) N17.9  Non-compliance Z91.19  
 Acute pulmonary edema ([facility]) J81.0  Acute respiratory distress R06.03  
 Accelerated hypertension I10  Lactic acidemia E87.2  
 
-Acute on chronic systolic heart failure, recent new diagnosis March 2018. He has acardiomyopathy with multiple risk factors and EF 20%, restrictive pattern as well with diastology. -EKG with LVH and repolarization abnormality.  
-History of ongoing tobacco use. -Hypertension poorly controlled despite beta-blocker.  
-Family history of premature coronary artery disease. 
-Medication non-adherence Primary cardiologist Dr. Levon Villaseñor Plan:  
 
-Patient has started to diurese and is feeling better.  
-Will have repeat echo today to reassess LV function and EF. 
-Will keep NPO for now and determine if patient can proceed to cath today.  
-His renal function is elevated compared to prior labs. -Watch lytes and I&O's 
-More recommendations pending clinical course, echo findings. 
-Will follow. History of Present Illness: This is a 66 y.o. male admitted for [facility]congestive heart failure) [facility] Patient complains of:  Patient admitted for increasing shortness of breath over the last few days. He states that he was doing reasonably well but felt his health decline over the last week or two.  He was watching a game yesterday when he started getting short of breath that progressively worsened. He denied any chest pains, palpitations, claudication or syncope. He has not been completely adherent to his medication regimen. Cardiac risk factors: family history, male gender, hypertension, cmy Review of Symptoms:  Except as stated above include: 
Constitutional:  negative Respiratory:  negative Cardiovascular:  negative Gastrointestinal: negative Genitourinary:  negative Musculoskeletal:  Negative Neurological:  Negative Dermatological:  Negative Endocrinological: Negative Psychological:  Negative Pertinent items are noted in HPI. Past Medical History:  
 
Past Medical History:  
Diagnosis Date  Heart failure ([facility]  Hypertension Social History:  
 
Social History Socioeconomic History  Marital status:  Spouse name: Not on file  Number of children: Not on file  Years of education: Not on file  Highest education level: Not on file Tobacco Use  Smoking status: Current Some Day Smoker Packs/day: 0.25  Smokeless tobacco: Never Used Substance and Sexual Activity  Alcohol use: No  
  Frequency: Never  Drug use: No  
 
 
 Family History: No family history on file. Medications: Allergies Allergen Reactions  Lopressor [Metoprolol Tartrate] Shortness of Breath Current Facility-Administered Medications Medication Dose Route Frequency  labetalol (NORMODYNE) tablet 200 mg  200 mg Oral Q12H  potassium chloride (K-DUR, KLOR-CON) SR tablet 40 mEq  40 mEq Oral DAILY  furosemide (LASIX) injection 40 mg  40 mg IntraVENous Q12H  hydrALAZINE (APRESOLINE) 20 mg/mL injection 10 mg  10 mg IntraVENous Q6H PRN  
 acetaminophen (TYLENOL) tablet 650 mg  650 mg Oral Q6H PRN  
 oxyCODONE-acetaminophen (PERCOCET) 5-325 mg per tablet 1 Tab  1 Tab Oral Q6H PRN  
 naloxone (NARCAN) injection 0.4 mg  0.4 mg IntraVENous PRN  
 ondansetron (ZOFRAN) injection 4 mg  4 mg IntraVENous Q6H PRN  
  docusate sodium (COLACE) capsule 100 mg  100 mg Oral BID PRN  
 aspirin chewable tablet 81 mg  81 mg Oral DAILY  heparin (porcine) injection 5,000 Units  5,000 Units SubCUTAneous Q8H  
 nicotine (NICODERM CQ) 14 mg/24 hr patch 1 Patch  1 Patch TransDERmal DAILY  atorvastatin (LIPITOR) tablet 40 mg  40 mg Oral QHS  amLODIPine (NORVASC) tablet 10 mg  10 mg Oral DAILY  hydrALAZINE (APRESOLINE) tablet 25 mg  25 mg Oral TID  isosorbide mononitrate ER (IMDUR) tablet 30 mg  30 mg Oral DAILY Physical Exam:  
 
Visit Vitals /80 (BP 1 Location: Left arm, BP Patient Position: At rest) Pulse 88 Temp 97.7 °F (36.5 °C) Resp 20 Ht 5' 7\" (1.702 m) Wt 69 kg (152 lb 1.9 oz) SpO2 100% BMI 23.82 kg/m² BP Readings from Last 3 Encounters:  
01/14/19 145/80  
07/26/18 (!) 210/120  
03/25/18 (!) 210/116 Pulse Readings from Last 3 Encounters:  
01/14/19 88  
07/26/18 93  
03/25/18 99 Wt Readings from Last 3 Encounters:  
01/14/19 69 kg (152 lb 1.9 oz)  
07/26/18 73.5 kg (162 lb) General:  alert, cooperative, no distress, appears stated age Neck:  nontender, no carotid bruit, no JVD Lungs:  clear to auscultation bilaterally Heart:  regular rate and rhythm, S1, S2 normal, no murmur, click, rub or gallop Abdomen:  abdomen is soft without significant tenderness, masses, organomegaly or guarding Extremities:  edema 2+ on R Skin: Warm and dry. no hyperpigmentation, vitiligo, or suspicious lesions Neuro: alert, oriented x3, affect appropriate, no focal neurological deficits, moves all extremities well, no involuntary movements Psych: non focal 
 
 Data Review:  
 
Recent Labs  
  01/14/19 0446 01/13/19 
1745 WBC 5.9 6.7 HGB 9.2* 10.3* HCT 27.4* 30.9*  268 Recent Labs  
  01/14/19 0446 01/13/19 
1745  141  
K 3.5 5.1  104 CO2 26 26 * 108* BUN 29* 28* CREA 1.84* 1.92* CA 8.3* 8.7 MG 2.0  --   
PHOS 3.9  --   
 ALB  --  3.0*  
SGOT  --  99* ALT  --  123* INR  --  1.4* Results for orders placed or performed during the hospital encounter of 01/13/19 EKG, 12 LEAD, INITIAL Result Value Ref Range Ventricular Rate 97 BPM  
 Atrial Rate 97 BPM  
 P-R Interval 184 ms QRS Duration 112 ms  
 Q-T Interval 392 ms QTC Calculation (Bezet) 497 ms Calculated P Axis 57 degrees Calculated R Axis -2 degrees Calculated T Axis 78 degrees Diagnosis Normal sinus rhythm Possible Left atrial enlargement Nonspecific T wave abnormality Prolonged QT Abnormal ECG When compared with ECG of 25-MAR-2018 10:12, 
T wave inversion less evident in Lateral leads Results for orders placed or performed in visit on 07/26/18 AMB POC EKG ROUTINE W/ 12 LEADS, INTER & REP Impression Sinus rhythm.  IVCD. LVH with repolarization abnormality. All Cardiac Markers in the last 24 hours:   
Lab Results Component Value Date/Time CPK 99 01/14/2019 04:46 AM  
  01/13/2019 05:45 PM  
 CKMB 2.7 01/14/2019 04:46 AM  
 CKMB 3.2 01/13/2019 05:45 PM  
 CKND1 2.7 01/14/2019 04:46 AM  
 CKND1 1.9 01/13/2019 05:45 PM  
 TROIQ 0.32 (H) 01/14/2019 04:46 AM  
 TROIQ 0.33 (H) 01/13/2019 09:49 PM  
 TROIQ 0.31 (H) 01/13/2019 05:45 PM  
 
 
Last Lipid:   
Lab Results Component Value Date/Time Cholesterol, total 68 01/14/2019 04:46 AM  
 HDL Cholesterol 20 (L) 01/14/2019 04:46 AM  
 LDL, calculated 36.4 01/14/2019 04:46 AM  
 Triglyceride 58 01/14/2019 04:46 AM  
 CHOL/HDL Ratio 3.4 01/14/2019 04:46 AM  
 
 
Signed By: DEBBY Casillas   
 January 14, 2019

## 2019-01-14 NOTE — ROUTINE PROCESS
Bedside shift report received from [address] [place] [address] and Assumed patient care. Patient in bed, awake, alert and oriented x3 in no distress. Denies pain or discomforts at this time. Call light placed within reach. Bed in low position. 7:17 AM - Bedside shift change report given to Breana Salazar (oncoming nurse) by Maty Gay RN (offgoing nurse). Report given with SBAR, Kardex, Intake/Output, MAR and Recent Results.

## 2019-01-14 NOTE — PROGRESS NOTES
Bedside and Verbal shift change report received from  [address] (off going nurse). Report included the following information SBAR, Kardex, MAR and Recent Results. Assumed care patient in bed awake. Fall prevention program maintained,call bell and bedside table within reach. No problems identified at this time,no complaints made by patient ,will continue care. 7:50 Cardiologist spoke w/ patient w/ order to keep pt. NPO starting now. PAtioent aware.

## 2019-01-14 NOTE — H&P
History & Physical 
Patient: Ivon Sanchez MRN: [id number]  CSN: [id number] YOB: 1960  Age: 66 y.o. Sex: male DOA: 1/13/2019 Chief Complaint:  
Chief Complaint Patient presents with  Shortness of Breath HPI:  
 
Ivon Sanchez is a 66 y.o.  male who has PMH of recently Dx Combined CHF with EF of 20%, HTN, Non-compliance and tobacco use. Pt presented to ER in progressively worsening SOB for the last few days and was found to have highly elevated BP,  Acute renal failure and mild troponin elevation Pt states that he was recently Dx with HF but he did not take it seriously and was not compliant with his meds including lasix  And fluid restriction Pt has an Echo done in 05/2018 with EF of 20% and grade III diastolic failure restrictive pattern ABG showed hyperventilation and mild A-a gradient on NC 2 L Past Medical History:  
Diagnosis Date  Heart failure ([address]  Hypertension No past surgical history on file. No family history on file. Social History Socioeconomic History  Marital status:  Spouse name: Not on file  Number of children: Not on file  Years of education: Not on file  Highest education level: Not on file Tobacco Use  Smoking status: Current Some Day Smoker Packs/day: 0.25  Smokeless tobacco: Never Used Substance and Sexual Activity  Alcohol use: No  
  Frequency: Never  Drug use: No  
 
 
Prior to Admission medications Medication Sig Start Date End Date Taking? Authorizing Provider  
losartan (COZAAR) 25 mg tablet Take 1 Tab by mouth daily. 7/26/18   Joan Peña MD  
metoprolol tartrate (LOPRESSOR) 50 mg tablet Take 1 Tab by mouth two (2) times a day. 5/4/18   Jeyson Gorman MD  
 
 
No Known Allergies Review of Systems GENERAL: Patient alert, awake and oriented times 3, Unable to communicate full sentences and in distress. HEENT: No change in vision, no earache, tinnitus, sore throat or sinus congestion. NECK: No pain or stiffness. PULMONARY: +ve shortness of breath, No cough or wheeze. Cardiovascular: +ve pnd / orthopnea, no CP GASTROINTESTINAL: No abdominal pain, nausea, vomiting or diarrhea, melena or bright red blood per rectum. GENITOURINARY: No urinary frequency, urgency, hesitancy or dysuria. MUSCULOSKELETAL: No joint or muscle pain, no back pain, no recent trauma. DERMATOLOGIC: No rash, no itching, no lesions. ENDOCRINE: No polyuria, polydipsia, no heat or cold intolerance. No recent change in weight. HEMATOLOGICAL: No anemia or easy bruising or bleeding. NEUROLOGIC: No headache, seizures, numbness, tingling or weakness. Physical Exam:  
 
Physical Exam: 
Visit Vitals BP (!) 149/103 Pulse 96 Temp 98.2 °F (36.8 °C) Resp 9 Ht 5' 7\" (1.702 m) Wt 70.3 kg (155 lb) SpO2 98% BMI 24.28 kg/m² O2 Flow Rate (L/min): 4 l/min O2 Device: BIPAP Temp (24hrs), Av.8 °F (36.6 °C), Min:97.3 °F (36.3 °C), Max:98.2 °F (36.8 °C) 
   1901 -  0700 In: 12 [I.V.:12] Out: 300 [Urine:300]   No intake/output data recorded. General:  Alert, cooperative, was in distress, appears stated age. Head: Normocephalic, without obvious abnormality, atraumatic. Eyes:  Conjunctivae/corneas clear. PERRL, EOMs intact. Nose: Nares normal. No drainage or sinus tenderness. Neck: Supple, symmetrical, trachea midline, no adenopathy, thyroid: no enlargement, no carotid bruit and no JVD. Lungs:   Decrease BS with +ve B/L rales, no wheezing . Heart:  Regular rate and rhythm, S1, S2 normal.  
  Abdomen: Soft, non-tender. Bowel sounds normal.   
Extremities: Extremities normal, atraumatic, no cyanosis+ 2+ LEedema. Pulses: 2+ and symmetric all extremities. Skin:  No rashes or lesions Neurologic: AAOx3, No focal motor or sensory deficit. Labs Reviewed: Lab results reviewed. For significant abnormal values and values requiring intervention, see assessment and plan. CXR and EKG Procedures/imaging: see electronic medical records for all procedures/Xrays and details which were not copied into this note but were reviewed prior to creation of Plan Assessment/Plan Principal Problem: 
  Acute on chronic combined systolic and diastolic congestive heart failure ([facility] (1/13/2019) Active Problems: 
  Tobacco abuse (1/13/2019) Acute renal failure (ARF) ([facility] (1/13/2019) Non-compliance (1/13/2019) [facility] (1/13/2019) Acute respiratory distress (1/13/2019) Accelerated hypertension (1/13/2019) Pt is admitted for Acute respiratory distress and impending respiratory failure with hyperventilation and mild hypoxia 2 ry to fluid overload Acute on chronic Combined CHF exacerbation Accelerated HTN LEV Will continue Nitro drip for now and down grade to PO meds Lasix IV BID Continue ASA, and statin Will add one dose of Labetalol IV x 1 as Pt states he got SOB on metoprolol for one hour Adding amlodipine, Hydralazine and Imdur PO and plan to stop Nitro drip Hold ARB for LEV Cardiology consult is called Series of cardiac enzymes >> mild elevation on the first >> ? Demand ischemia Pt has lactic acidemia in ER mostly 2 ry to Hypoxia >> will trend No Abx as pt is not septic Of note Pt is allergic to metoprolol but tolerated labetalol DVT/GI Prophylaxis: Hep SQ Plan of care is discussed in details with Patient/Family at bedside and agreed upon Raymond Valdez MD 
1/13/2019 7:59 PM

## 2019-01-14 NOTE — PROGRESS NOTES
Report given to [facility] holding. Advised to give all antihypertensive medication & aspirin w/ sips of water. Will send transport soon. Patient updated. 46- Cardiologist consented the patient. Verified if lasix is ok to be given now. MD olguin.

## 2019-01-15 ENCOUNTER — APPOINTMENT (OUTPATIENT)
Dept: ULTRASOUND IMAGING | Age: 59
DRG: 247 | End: 2019-01-15
Attending: EMERGENCY MEDICINE
Payer: SUBSIDIZED

## 2019-01-15 LAB
ANION GAP SERPL CALC-SCNC: 10 MMOL/L (ref 3–18)
ATRIAL RATE: 92 BPM
BASOPHILS # BLD: 0 K/UL (ref 0–0.1)
BASOPHILS NFR BLD: 0 % (ref 0–2)
BUN SERPL-MCNC: 24 MG/DL (ref 7–18)
BUN/CREAT SERPL: 17 (ref 12–20)
CALCIUM SERPL-MCNC: 8.3 MG/DL (ref 8.5–10.1)
CALCULATED P AXIS, ECG09: 55 DEGREES
CALCULATED T AXIS, ECG11: 88 DEGREES
CHLORIDE SERPL-SCNC: 108 MMOL/L (ref 100–108)
CO2 SERPL-SCNC: 22 MMOL/L (ref 21–32)
CREAT SERPL-MCNC: 1.42 MG/DL (ref 0.6–1.3)
DIAGNOSIS, 93000: NORMAL
DIFFERENTIAL METHOD BLD: ABNORMAL
EOSINOPHIL # BLD: 0.1 K/UL (ref 0–0.4)
EOSINOPHIL NFR BLD: 1 % (ref 0–5)
ERYTHROCYTE [DISTWIDTH] IN BLOOD BY AUTOMATED COUNT: 18 % (ref 11.6–14.5)
GLUCOSE SERPL-MCNC: 120 MG/DL (ref 74–99)
HCT VFR BLD AUTO: 28.5 % (ref 36–48)
HGB BLD-MCNC: 9.6 G/DL (ref 13–16)
LYMPHOCYTES # BLD: 1.2 K/UL (ref 0.9–3.6)
LYMPHOCYTES NFR BLD: 17 % (ref 21–52)
MAGNESIUM SERPL-MCNC: 2.1 MG/DL (ref 1.6–2.6)
MCH RBC QN AUTO: 24.7 PG (ref 24–34)
MCHC RBC AUTO-ENTMCNC: 33.7 G/DL (ref 31–37)
MCV RBC AUTO: 73.3 FL (ref 74–97)
MONOCYTES # BLD: 0.5 K/UL (ref 0.05–1.2)
MONOCYTES NFR BLD: 7 % (ref 3–10)
NEUTS SEG # BLD: 5.4 K/UL (ref 1.8–8)
NEUTS SEG NFR BLD: 75 % (ref 40–73)
P-R INTERVAL, ECG05: 202 MS
PLATELET # BLD AUTO: 248 K/UL (ref 135–420)
PMV BLD AUTO: 9.9 FL (ref 9.2–11.8)
POTASSIUM SERPL-SCNC: 3.8 MMOL/L (ref 3.5–5.5)
Q-T INTERVAL, ECG07: 398 MS
QRS DURATION, ECG06: 116 MS
QTC CALCULATION (BEZET), ECG08: 492 MS
RBC # BLD AUTO: 3.89 M/UL (ref 4.7–5.5)
SODIUM SERPL-SCNC: 140 MMOL/L (ref 136–145)
VENTRICULAR RATE, ECG03: 92 BPM
WBC # BLD AUTO: 7.1 K/UL (ref 4.6–13.2)

## 2019-01-15 PROCEDURE — 74011250637 HC RX REV CODE- 250/637: Performed by: INTERNAL MEDICINE

## 2019-01-15 PROCEDURE — 65660000004 HC RM CVT STEPDOWN

## 2019-01-15 PROCEDURE — 74011250636 HC RX REV CODE- 250/636: Performed by: INTERNAL MEDICINE

## 2019-01-15 PROCEDURE — 93005 ELECTROCARDIOGRAM TRACING: CPT

## 2019-01-15 PROCEDURE — 74011250637 HC RX REV CODE- 250/637: Performed by: PHYSICIAN ASSISTANT

## 2019-01-15 PROCEDURE — 80048 BASIC METABOLIC PNL TOTAL CA: CPT

## 2019-01-15 PROCEDURE — 85025 COMPLETE CBC W/AUTO DIFF WBC: CPT

## 2019-01-15 PROCEDURE — 83735 ASSAY OF MAGNESIUM: CPT

## 2019-01-15 PROCEDURE — 76770 US EXAM ABDO BACK WALL COMP: CPT

## 2019-01-15 PROCEDURE — 36415 COLL VENOUS BLD VENIPUNCTURE: CPT

## 2019-01-15 RX ORDER — CARVEDILOL 3.12 MG/1
3.12 TABLET ORAL 2 TIMES DAILY WITH MEALS
Status: DISCONTINUED | OUTPATIENT
Start: 2019-01-15 | End: 2019-01-16 | Stop reason: HOSPADM

## 2019-01-15 RX ADMIN — TICAGRELOR 90 MG: 90 TABLET ORAL at 17:25

## 2019-01-15 RX ADMIN — TICAGRELOR 90 MG: 90 TABLET ORAL at 09:13

## 2019-01-15 RX ADMIN — Medication 10 ML: at 14:00

## 2019-01-15 RX ADMIN — Medication 10 ML: at 21:22

## 2019-01-15 RX ADMIN — FUROSEMIDE 40 MG: 10 INJECTION, SOLUTION INTRAMUSCULAR; INTRAVENOUS at 21:17

## 2019-01-15 RX ADMIN — Medication 10 ML: at 05:33

## 2019-01-15 RX ADMIN — POTASSIUM CHLORIDE 40 MEQ: 20 TABLET, EXTENDED RELEASE ORAL at 09:12

## 2019-01-15 RX ADMIN — CARVEDILOL 3.12 MG: 3.12 TABLET, FILM COATED ORAL at 17:25

## 2019-01-15 RX ADMIN — HEPARIN SODIUM 5000 UNITS: 5000 INJECTION INTRAVENOUS; SUBCUTANEOUS at 05:33

## 2019-01-15 RX ADMIN — HYDRALAZINE HYDROCHLORIDE 25 MG: 25 TABLET, FILM COATED ORAL at 15:28

## 2019-01-15 RX ADMIN — ISOSORBIDE MONONITRATE 30 MG: 30 TABLET, EXTENDED RELEASE ORAL at 09:12

## 2019-01-15 RX ADMIN — FUROSEMIDE 40 MG: 10 INJECTION, SOLUTION INTRAMUSCULAR; INTRAVENOUS at 09:12

## 2019-01-15 RX ADMIN — HEPARIN SODIUM 5000 UNITS: 5000 INJECTION INTRAVENOUS; SUBCUTANEOUS at 21:17

## 2019-01-15 RX ADMIN — HYDRALAZINE HYDROCHLORIDE 25 MG: 25 TABLET, FILM COATED ORAL at 09:12

## 2019-01-15 RX ADMIN — HYDRALAZINE HYDROCHLORIDE 25 MG: 25 TABLET, FILM COATED ORAL at 21:18

## 2019-01-15 RX ADMIN — ACETAMINOPHEN 650 MG: 325 TABLET ORAL at 21:17

## 2019-01-15 RX ADMIN — ASPIRIN 81 MG CHEWABLE TABLET 81 MG: 81 TABLET CHEWABLE at 09:12

## 2019-01-15 RX ADMIN — TICAGRELOR 90 MG: 90 TABLET ORAL at 00:30

## 2019-01-15 RX ADMIN — ATORVASTATIN CALCIUM 40 MG: 40 TABLET, FILM COATED ORAL at 21:16

## 2019-01-15 RX ADMIN — CARVEDILOL 3.12 MG: 3.12 TABLET, FILM COATED ORAL at 09:12

## 2019-01-15 RX ADMIN — AMLODIPINE BESYLATE 10 MG: 10 TABLET ORAL at 09:12

## 2019-01-15 RX ADMIN — HEPARIN SODIUM 5000 UNITS: 5000 INJECTION INTRAVENOUS; SUBCUTANEOUS at 12:06

## 2019-01-15 NOTE — PROGRESS NOTES
[facility] - Progress Note Admit Date: 1/13/2019 Assessment:  
 
Hospital Problems  Date Reviewed: 7/26/2018 Codes Class Noted POA Lactic acidemia ICD-10-CM: E87.2 ICD-9-CM: 276.2  1/14/2019 Unknown * (Principal) Acute on chronic combined systolic and diastolic congestive heart failure (HCC) ICD-10-CM: I50.43 ICD-9-CM: 428.43, 428.0  1/13/2019 Unknown Tobacco abuse ICD-10-CM: Z72.0 ICD-9-CM: 305.1  1/13/2019 Unknown Acute renal failure (ARF) (HCC) ICD-10-CM: N17.9 ICD-9-CM: 584.9  1/13/2019 Unknown Non-compliance ICD-10-CM: Z91.19 ICD-9-CM: V15.81  1/13/2019 Unknown Acute pulmonary edema (HCC) ICD-10-CM: J81.0 ICD-9-CM: 518.4  1/13/2019 Unknown Acute respiratory distress ICD-10-CM: R06.03 
ICD-9-CM: 518.82  1/13/2019 Unknown Accelerated hypertension ICD-10-CM: I10 
ICD-9-CM: 401.0  1/13/2019 Unknown  
   
  
 
 
-Acute on chronic systolic heart failure, recent new diagnosis May 2018. He has [address] with EF 20%, restrictive pattern, grade 3 DD. -CAD two-vessel CAD by cath 1/14/2019 with moderate proximal circumflex 70%, mid RCA and distal RCA 70%. Severe proximal RCA  99% stenosis with LAUREN II flow. Successful angioplasty and stent of proximal RCA 99% residual 0% using CHINMAY. The other vessels were not intervene because of his renal function with creatinine 1.8. 
-Nonischemic cardiomyopathy. EF 20% by echo 5/2018. LV dysfunction out of proportion to CAD. -EKG with LVH and repolarization abnormality.  
-History of ongoing tobacco use. -Hypertension poorly controlled despite beta-blocker.  
-Family history of premature coronary artery disease. 
-Medication non-adherence 
  
Primary cardiologist Dr. Galalrdo Oar Plan:  
 
Doing well post PCI. Patient with SOB last night, better this AM. Patient continues to diurese with improved renal function, will continue IV lasix for today. Continued on ASA, statin, brilinta. Continued on norvasc, hydral, imdur. No ace or arb given renal function. Lopressor listed as allergy causing SOB, will try low dose coreg this AM. Will get follow up echocardiogram. 
Will arrange for lifevest prior to discharge. Subjective: SOB last night, improving this AM. Objective:  
  
Patient Vitals for the past 8 hrs: 
 Temp Pulse Resp BP SpO2  
01/15/19 0717 98.2 °F (36.8 °C) 94 20 (!) 168/92 97 % 01/15/19 0413 97.5 °F (36.4 °C) 63 20 152/82 98 % Patient Vitals for the past 96 hrs: 
 Weight 01/15/19 0413 70.5 kg (155 lb 8 oz) 01/14/19 0409 69 kg (152 lb 1.9 oz) 01/13/19 1720 70.3 kg (155 lb) Intake/Output Summary (Last 24 hours) at 1/15/2019 0805 Last data filed at 1/15/2019 0740 Gross per 24 hour Intake 150 ml Output 3140 ml Net -2990 ml Physical Exam: 
General:  alert, cooperative, no distress, appears stated age Neck:  no JVD Lungs:  clear to auscultation bilaterally Heart:  regular rate and rhythm Abdomen:  abdomen is soft without significant tenderness, masses, organomegaly or guarding Extremities:  extremities normal, atraumatic, no cyanosis or edema. Right radial site without hematoma with full peripheral pulses. Data Review:  
 
Labs: Results:  
   
Chemistry Recent Labs  
  01/15/19 
0515 01/14/19 
0446 01/13/19 
1745 * 149* 108*  141 141  
K 3.8 3.5 5.1  106 104 CO2 22 26 26 BUN 24* 29* 28* CREA 1.42* 1.84* 1.92* CA 8.3* 8.3* 8.7 MG 2.1 2.0  --   
PHOS  --  3.9  --   
AGAP 10 9 11 BUCR 17 16 15 AP  --   --  100 TP  --   --  8.0 ALB  --   --  3.0*  
GLOB  --   --  5.0* AGRAT  --   --  0.6* CBC w/Diff Recent Labs  
  01/15/19 
0515 01/14/19 
0446 01/13/19 
1745 WBC 7.1 5.9 6.7  
RBC 3.89* 3.71* 4.15* HGB 9.6* 9.2* 10.3* HCT 28.5* 27.4* 30.9*  248 268 GRANS 75* 69 73 LYMPH 17* 23 21 EOS 1 0 0  
  
 Cardiac Enzymes No results found for: CPK, CK, CKMMB, CKMB, RCK3, CKMBT, CKNDX, CKND1, ROSAS, TROPT, TROIQ, PURVI, TROPT, TNIPOC, BNP, BNPP Coagulation Recent Labs  
  01/13/19 
1745 PTP 16.7* INR 1.4* Lipid Panel Lab Results Component Value Date/Time Cholesterol, total 68 01/14/2019 04:46 AM  
 HDL Cholesterol 20 (L) 01/14/2019 04:46 AM  
 LDL, calculated 36.4 01/14/2019 04:46 AM  
 VLDL, calculated 11.6 01/14/2019 04:46 AM  
 Triglyceride 58 01/14/2019 04:46 AM  
 CHOL/HDL Ratio 3.4 01/14/2019 04:46 AM  
  
BNP No results found for: BNP, BNPP, XBNPT Liver Enzymes Recent Labs  
  01/13/19 
1745 TP 8.0 ALB 3.0*  
 SGOT 99* Digoxin Thyroid Studies No results found for: T4, T3U, TSH, TSHEXT Signed By: DEBBY Miles   
 January 15, 2019

## 2019-01-15 NOTE — PROGRESS NOTES
Primary Nurse Handy Amezcua RN and Vinay Trujillo RN performed a dual skin assessment on this patient No impairment noted Chad score is 21

## 2019-01-15 NOTE — PROGRESS NOTES
NUTRITION Nursing Referral: [facility] 
  
RECOMMENDATIONS / PLAN:  
 
- Add supplements: Ensure Enlive BID.  
- Continue RD inpatient monitoring and evaluation. NUTRITION INTERVENTIONS & DIAGNOSIS:  
 
[x] Meals/snacks: modified composition 
[x] Medical food supplement therapy: initiate Nutrition Diagnosis: Unintended weight loss related to predicted inadequate energy intake as evidenced by pt with a 10 lb (6.1%) weight loss x 6 months PTA per chart review. ASSESSMENT:  
 
1/15: Pt tolerating diet with good appetite, ate most of breakfast. Agreeable to supplements. 1/14: Admitted with CHF and NPO for cardiac cath today. Reports some unplanned weight loss x several months PTA with decreased meal consumption due to his wife being ill and caring for her over the past several months. Reports he has been putting her needs first and has not been eating as much as normal. Pt currently with good appetite and meal intake. Pt receptive to cardiac diet discussion. Average po intake adequate to meet patients estimated nutritional needs:   [] Yes     [x] No   [] Unable to determine at this time Diet: DIET CARDIAC Regular Food Allergies: NKFA Current Appetite:   [x] Good     [] Fair     [] Poor     [] Other: 
Appetite/meal intake prior to admission:   [] Good     [x] Fair x several months     [] Poor     [] Other: 
Feeding Limitations:  [] Swallowing difficulty    [] Chewing difficulty    [] Other: 
Current Meal Intake:  
Patient Vitals for the past 100 hrs: 
 % Diet Eaten 01/15/19 1315 100 % 01/15/19 0908 100 % BM: 1/13 Skin Integrity: WDL Edema:   [] No     [x] Yes Pertinent Medications: Reviewed:  colace, lasix, zofran, 40 mEq KCL Recent Labs  
  01/15/19 
0515 01/14/19 
0446 01/13/19 
1745  141 141  
K 3.8 3.5 5.1  106 104 CO2 22 26 26 * 149* 108* BUN 24* 29* 28* CREA 1.42* 1.84* 1.92* CA 8.3* 8.3* 8.7 MG 2.1 2.0  --   
PHOS  --  3.9  --   
 ALB  --   --  3.0*  
SGOT  --   --  99* ALT  --   --  123* Intake/Output Summary (Last 24 hours) at 1/15/2019 1320 Last data filed at 1/15/2019 1315 Gross per 24 hour Intake 360 ml Output 2700 ml Net -2340 ml Anthropometrics: 
Ht Readings from Last 1 Encounters:  
01/13/19 5' 7\" (1.702 m) Last 3 Recorded Weights in this Encounter 01/13/19 1720 01/14/19 0409 01/15/19 0413 Weight: 70.3 kg (155 lb) 69 kg (152 lb 1.9 oz) 70.5 kg (155 lb 8 oz) Body mass index is 24.35 kg/m². Weight History: Pt reports a UBW of 160 lbs a few months ago. Per chart hx review pt with a 10 lb (6.1%) weight loss x 6 months PTA Weight Metrics 1/15/2019 7/26/2018 Weight 155 lb 8 oz 162 lb BMI 24.35 kg/m2 25.37 kg/m2 Admitting Diagnosis: CHF (congestive heart failure) ([facility] [address] Pertinent PMHx: HTN, heart failure Education Needs:        [x] None identified  [] Identified - Not appropriate at this time  []  Identified and addressed - refer to education log Learning Limitations:   [x] None identified  [] Identified Cultural, Islam & ethnic food preferences:  [x] None identified    [] Identified and addressed ESTIMATED NUTRITION NEEDS:  
 
Calories: 3041-3644 kcal (MSJx1.2-1.3) based on  [x] Actual BW: 69 kg      [] IBW Protein: 55-83 gm (1-1.2 gm/kg) based on  [x] Actual BW      [] IBW Fluid: 1 mL/kcal 
  
MONITORING & EVALUATION:  
 
Nutrition Goal(s): 1. Po intake of meals will meet >75% of patient estimated nutritional needs within the next 7 days. Outcome:  [x] Met/Ongoing    []  Not Met    [] New/Initial Goal  
 
Monitoring:   [x] Food and beverage intake   [x] Diet order   [x] Nutrition-focused physical findings   [x] Treatment/therapy   [] Weight   [] Enteral nutrition intake Previous Recommendations (for follow-up assessments only):     [x]   Implemented       []   Not Implemented (RD to address)      [] No Longer Appropriate     [] No Recommendation Made Discharge Planning: cardiac diet [x] Participated in care planning, discharge planning, & interdisciplinary rounds as appropriate Kizzy Shaikh RD, [address]  Pager: [phone]

## 2019-01-15 NOTE — PROGRESS NOTES
[facility] Hospitalist Group Progress Note Patient: Rosa Isela Diggs Age: 66 y.o. : 1960 MR#: [id number] SSN: [id number] Date/Time: 1/15/2019 Subjective:  
Patient sitting in bed in NAD, awake, alert, denies cp or sob. Wife at bedside Assessment/Plan:  
 
- acute on chronic systolic chf exac 
- ischemic cardiomyopathy, ef-20% 
- CAD 
- Tobacco abuse - Hypertensive Urgency at admission 
- h/o noncompliance - LEV 
- Right renal mass, ?  [address] PLAN 
S/p pci and stent On DAPT 
lasix Counseled smoking cessation Monitor renal function Counseled compliance Await Life vest- d/w  Urology consulted D/w patient Case discussed with:  [x]Patient  [x]Family  []Nursing  []Case Management DVT Prophylaxis:  []Lovenox  []Hep SQ  []SCDs  []Coumadin   []On Heparin gtt Objective:  
VS:  
Visit Vitals /86 Pulse 92 Temp 97.2 °F (36.2 °C) Resp 23 Ht 5' 7\" (1.702 m) Wt 70.5 kg (155 lb 8 oz) SpO2 100% BMI 24.35 kg/m² Tmax/24hrs: Temp (24hrs), Av.6 °F (36.4 °C), Min:97.2 °F (36.2 °C), Max:98.2 °F (36.8 °C) Input/Output:  
 
Intake/Output Summary (Last 24 hours) at 1/15/2019 1429 Last data filed at 1/15/2019 1315 Gross per 24 hour Intake 360 ml Output 2500 ml Net -2140 ml General:  Awake, alert Cardiovascular:  S1S2+, RRR Pulmonary:  CTA b/l GI:  Soft, BS+, NT, ND Extremities:  trace edema Labs:   
Recent Results (from the past 24 hour(s)) METABOLIC PANEL, BASIC Collection Time: 01/15/19  5:15 AM  
Result Value Ref Range Sodium 140 136 - 145 mmol/L Potassium 3.8 3.5 - 5.5 mmol/L Chloride 108 100 - 108 mmol/L  
 CO2 22 21 - 32 mmol/L Anion gap 10 3.0 - 18 mmol/L Glucose 120 (H) 74 - 99 mg/dL BUN 24 (H) 7.0 - 18 MG/DL Creatinine 1.42 (H) 0.6 - 1.3 MG/DL  
 BUN/Creatinine ratio 17 12 - 20 GFR est AA >60 >60 ml/min/1.73m2 GFR est non-AA 52 (L) >60 ml/min/1.73m2 Calcium 8.3 (L) 8.5 - 10.1 MG/DL  
CBC WITH AUTOMATED DIFF Collection Time: 01/15/19  5:15 AM  
Result Value Ref Range WBC 7.1 4.6 - 13.2 K/uL  
 RBC 3.89 (L) 4.70 - 5.50 M/uL HGB 9.6 (L) 13.0 - 16.0 g/dL HCT 28.5 (L) 36.0 - 48.0 % MCV 73.3 (L) 74.0 - 97.0 FL  
 MCH 24.7 24.0 - 34.0 PG  
 MCHC 33.7 31.0 - 37.0 g/dL  
 RDW 18.0 (H) 11.6 - 14.5 % PLATELET 906 213 - 928 K/uL MPV 9.9 9.2 - 11.8 FL  
 NEUTROPHILS 75 (H) 40 - 73 % LYMPHOCYTES 17 (L) 21 - 52 % MONOCYTES 7 3 - 10 % EOSINOPHILS 1 0 - 5 % BASOPHILS 0 0 - 2 %  
 ABS. NEUTROPHILS 5.4 1.8 - 8.0 K/UL  
 ABS. LYMPHOCYTES 1.2 0.9 - 3.6 K/UL  
 ABS. MONOCYTES 0.5 0.05 - 1.2 K/UL  
 ABS. EOSINOPHILS 0.1 0.0 - 0.4 K/UL  
 ABS. BASOPHILS 0.0 0.0 - 0.1 K/UL  
 DF AUTOMATED MAGNESIUM Collection Time: 01/15/19  5:15 AM  
Result Value Ref Range Magnesium 2.1 1.6 - 2.6 mg/dL EKG, 12 LEAD, SUBSEQUENT Collection Time: 01/15/19  7:02 AM  
Result Value Ref Range Ventricular Rate 92 BPM  
 Atrial Rate 92 BPM  
 P-R Interval 202 ms QRS Duration 116 ms  
 Q-T Interval 398 ms QTC Calculation (Bezet) 492 ms Calculated P Axis 55 degrees Calculated T Axis 88 degrees Diagnosis Normal sinus rhythm Possible Left atrial enlargement Left ventricular hypertrophy with QRS widening T wave abnormality, consider lateral ischemia Prolonged QT Abnormal ECG No previous ECGs available Additional Data Reviewed:   
 
Signed By: Anna Khalil MD   
 January 15, 2019

## 2019-01-15 NOTE — NURSE NAVIGATOR
Reason for Admission:  CHF (congestive heart failure) ([facility] [address] RRAT Score:    6 Plan for utilizing home health: To be determined - probably so Likelihood of Readmission:   LOW Transition of Care Plan:         
 
 
Initial assessment completed with patient. Face sheet information confirmed:  yes. This patient lives in a single family home with children  and wife. Patient is able to navigate steps as needed. Prior to hospitalization, patient was considered to be independent : yes . Cognitive status of patient: oriented to time, place, person and situation. Patient has a current ACP document on file: no The wife will be available to transport patient home upon discharge. The patient already has straight cane and walker available in the home. Patient currently has no insurance so will need assistance in obtaining his medications. Have asked [facility] to see the patient. Patient is not currently active with home health. Patient has not ever stayed in a skilled nursing facility or rehab. This patient is on dialysis :no 
 
List of available Home Health was given to the patient to review. Freedom of choice signed: no. Currently, the discharge plan is Home with Home Health. Care Management Interventions PCP Verified by CM: (no pcp) Mode of Transport at Discharge: Self Transition of Care Consult (CM Consult): Home Health Physical Therapy Consult: No 
Current Support Network: Lives with Spouse Confirm Follow Up Transport: Family Discharge Location Discharge Placement: Home with home health Binh De Los Santos. STEPHANIE Benjamin, RN Care Management [phone]

## 2019-01-16 ENCOUNTER — APPOINTMENT (OUTPATIENT)
Dept: NON INVASIVE DIAGNOSTICS | Age: 59
DRG: 247 | End: 2019-01-16
Attending: PHYSICIAN ASSISTANT
Payer: SUBSIDIZED

## 2019-01-16 ENCOUNTER — HOME HEALTH ADMISSION (OUTPATIENT)
Dept: HOME HEALTH SERVICES | Facility: HOME HEALTH | Age: 59
End: 2019-01-16
Payer: SUBSIDIZED

## 2019-01-16 VITALS
HEIGHT: 67 IN | SYSTOLIC BLOOD PRESSURE: 125 MMHG | BODY MASS INDEX: 24.33 KG/M2 | RESPIRATION RATE: 18 BRPM | TEMPERATURE: 97.5 F | WEIGHT: 155 LBS | DIASTOLIC BLOOD PRESSURE: 85 MMHG | HEART RATE: 93 BPM | OXYGEN SATURATION: 98 %

## 2019-01-16 LAB
ANION GAP SERPL CALC-SCNC: 8 MMOL/L (ref 3–18)
BASOPHILS # BLD: 0 K/UL (ref 0–0.1)
BASOPHILS NFR BLD: 0 % (ref 0–2)
BUN SERPL-MCNC: 29 MG/DL (ref 7–18)
BUN/CREAT SERPL: 20 (ref 12–20)
CALCIUM SERPL-MCNC: 8.5 MG/DL (ref 8.5–10.1)
CHLORIDE SERPL-SCNC: 109 MMOL/L (ref 100–108)
CO2 SERPL-SCNC: 21 MMOL/L (ref 21–32)
CREAT SERPL-MCNC: 1.44 MG/DL (ref 0.6–1.3)
DIFFERENTIAL METHOD BLD: ABNORMAL
ECHO LV INTERNAL DIMENSION DIASTOLIC: 5.09 CM (ref 4.2–5.9)
ECHO LV INTERNAL DIMENSION SYSTOLIC: 4.76 CM
ECHO LV IVSD: 1.62 CM (ref 0.6–1)
ECHO LV MASS 2D: 465.2 G (ref 88–224)
ECHO LV MASS INDEX 2D: 256.4 G/M2 (ref 49–115)
ECHO LV POSTERIOR WALL DIASTOLIC: 1.69 CM (ref 0.6–1)
ECHO LVOT PEAK GRADIENT: 1.9 MMHG
ECHO LVOT PEAK VELOCITY: 68.58 CM/S
ECHO LVOT VTI: 9.23 CM
EOSINOPHIL # BLD: 0.1 K/UL (ref 0–0.4)
EOSINOPHIL NFR BLD: 1 % (ref 0–5)
ERYTHROCYTE [DISTWIDTH] IN BLOOD BY AUTOMATED COUNT: 18.1 % (ref 11.6–14.5)
GLUCOSE SERPL-MCNC: 81 MG/DL (ref 74–99)
HCT VFR BLD AUTO: 31.5 % (ref 36–48)
HGB BLD-MCNC: 10.5 G/DL (ref 13–16)
LYMPHOCYTES # BLD: 1.7 K/UL (ref 0.9–3.6)
LYMPHOCYTES NFR BLD: 26 % (ref 21–52)
MCH RBC QN AUTO: 24.5 PG (ref 24–34)
MCHC RBC AUTO-ENTMCNC: 33.3 G/DL (ref 31–37)
MCV RBC AUTO: 73.4 FL (ref 74–97)
MONOCYTES # BLD: 0.6 K/UL (ref 0.05–1.2)
MONOCYTES NFR BLD: 9 % (ref 3–10)
NEUTS SEG # BLD: 4.2 K/UL (ref 1.8–8)
NEUTS SEG NFR BLD: 64 % (ref 40–73)
PLATELET # BLD AUTO: 288 K/UL (ref 135–420)
PMV BLD AUTO: 10.4 FL (ref 9.2–11.8)
POTASSIUM SERPL-SCNC: 4.5 MMOL/L (ref 3.5–5.5)
RBC # BLD AUTO: 4.29 M/UL (ref 4.7–5.5)
SODIUM SERPL-SCNC: 138 MMOL/L (ref 136–145)
WBC # BLD AUTO: 6.6 K/UL (ref 4.6–13.2)

## 2019-01-16 PROCEDURE — 80048 BASIC METABOLIC PNL TOTAL CA: CPT

## 2019-01-16 PROCEDURE — 36415 COLL VENOUS BLD VENIPUNCTURE: CPT

## 2019-01-16 PROCEDURE — 85025 COMPLETE CBC W/AUTO DIFF WBC: CPT

## 2019-01-16 PROCEDURE — 93308 TTE F-UP OR LMTD: CPT

## 2019-01-16 PROCEDURE — 74011250637 HC RX REV CODE- 250/637: Performed by: INTERNAL MEDICINE

## 2019-01-16 PROCEDURE — 87086 URINE CULTURE/COLONY COUNT: CPT

## 2019-01-16 PROCEDURE — 77030027138 HC INCENT SPIROMETER -A

## 2019-01-16 PROCEDURE — 74011250636 HC RX REV CODE- 250/636: Performed by: INTERNAL MEDICINE

## 2019-01-16 PROCEDURE — 74011250637 HC RX REV CODE- 250/637: Performed by: PHYSICIAN ASSISTANT

## 2019-01-16 RX ORDER — GUAIFENESIN 100 MG/5ML
81 LIQUID (ML) ORAL DAILY
Qty: 30 TAB | Refills: 0 | Status: SHIPPED | OUTPATIENT
Start: 2019-01-17 | End: 2019-02-13 | Stop reason: SDUPTHER

## 2019-01-16 RX ORDER — AMLODIPINE BESYLATE 10 MG/1
10 TABLET ORAL DAILY
Qty: 30 TAB | Refills: 0 | Status: SHIPPED | OUTPATIENT
Start: 2019-01-17 | End: 2019-02-13 | Stop reason: SDUPTHER

## 2019-01-16 RX ORDER — ATORVASTATIN CALCIUM 40 MG/1
40 TABLET, FILM COATED ORAL
Qty: 30 TAB | Refills: 0 | Status: SHIPPED | OUTPATIENT
Start: 2019-01-16 | End: 2019-02-13 | Stop reason: SDUPTHER

## 2019-01-16 RX ORDER — ISOSORBIDE MONONITRATE 30 MG/1
30 TABLET, EXTENDED RELEASE ORAL DAILY
Qty: 30 TAB | Refills: 0 | Status: SHIPPED | OUTPATIENT
Start: 2019-01-17 | End: 2019-02-18 | Stop reason: SDUPTHER

## 2019-01-16 RX ORDER — DOCUSATE SODIUM 100 MG/1
100 CAPSULE, LIQUID FILLED ORAL
Qty: 60 CAP | Refills: 0 | Status: SHIPPED | OUTPATIENT
Start: 2019-01-16 | End: 2020-12-03

## 2019-01-16 RX ORDER — FUROSEMIDE 40 MG/1
40 TABLET ORAL 2 TIMES DAILY
Qty: 60 TAB | Refills: 0 | Status: SHIPPED | OUTPATIENT
Start: 2019-01-16 | End: 2019-02-13 | Stop reason: SDUPTHER

## 2019-01-16 RX ORDER — FUROSEMIDE 40 MG/1
40 TABLET ORAL 2 TIMES DAILY
Status: DISCONTINUED | OUTPATIENT
Start: 2019-01-16 | End: 2019-01-16 | Stop reason: HOSPADM

## 2019-01-16 RX ORDER — NITROGLYCERIN 0.4 MG/1
0.4 TABLET SUBLINGUAL
Qty: 20 TAB | Refills: 0 | Status: SHIPPED | OUTPATIENT
Start: 2019-01-16 | End: 2019-02-13 | Stop reason: SDUPTHER

## 2019-01-16 RX ORDER — CARVEDILOL 3.12 MG/1
3.12 TABLET ORAL 2 TIMES DAILY WITH MEALS
Qty: 60 TAB | Refills: 0 | Status: SHIPPED | OUTPATIENT
Start: 2019-01-16 | End: 2019-02-13 | Stop reason: SDUPTHER

## 2019-01-16 RX ORDER — HYDRALAZINE HYDROCHLORIDE 25 MG/1
25 TABLET, FILM COATED ORAL 3 TIMES DAILY
Qty: 90 TAB | Refills: 0 | Status: SHIPPED | OUTPATIENT
Start: 2019-01-16 | End: 2019-02-13 | Stop reason: SDUPTHER

## 2019-01-16 RX ADMIN — HYDRALAZINE HYDROCHLORIDE 25 MG: 25 TABLET, FILM COATED ORAL at 08:10

## 2019-01-16 RX ADMIN — HEPARIN SODIUM 5000 UNITS: 5000 INJECTION INTRAVENOUS; SUBCUTANEOUS at 12:42

## 2019-01-16 RX ADMIN — ASPIRIN 81 MG CHEWABLE TABLET 81 MG: 81 TABLET CHEWABLE at 08:10

## 2019-01-16 RX ADMIN — TICAGRELOR 90 MG: 90 TABLET ORAL at 08:10

## 2019-01-16 RX ADMIN — AMLODIPINE BESYLATE 10 MG: 10 TABLET ORAL at 08:10

## 2019-01-16 RX ADMIN — FUROSEMIDE 40 MG: 40 TABLET ORAL at 10:20

## 2019-01-16 RX ADMIN — CARVEDILOL 3.12 MG: 3.12 TABLET, FILM COATED ORAL at 08:10

## 2019-01-16 RX ADMIN — ISOSORBIDE MONONITRATE 30 MG: 30 TABLET, EXTENDED RELEASE ORAL at 08:10

## 2019-01-16 RX ADMIN — Medication 10 ML: at 05:26

## 2019-01-16 RX ADMIN — HEPARIN SODIUM 5000 UNITS: 5000 INJECTION INTRAVENOUS; SUBCUTANEOUS at 05:21

## 2019-01-16 NOTE — PROGRESS NOTES
I saw, examined, and evaluated the patient. I personally reviewed the patient's labs, tests, vitals, orders, medications, updated history, and other providers assessments. I personally agree with the findings as stated and the plan as documented. Long discussion with patient about Lifevest and permanent AICD. He does not want the Lifevest, understands risks. I also stated permanent AICD not indicated at this time and he understands. I then discussed with Dr. Bettyjane Canavan and concern for renal mass/cancer, which would preclude AICD. His CHF is compensated and can discharge with outpatient follow-up from my standpoing. I discussed with Dr. Nitesh Watson, could hold Brilinta in 1 month for biopsy if needed.

## 2019-01-16 NOTE — PROGRESS NOTES
1920 Bedside turnover given to me by MIGUELITO Sheppard. Pt is in the chair visiting with his son and daughter in law. He is wearing the cardiac telemetry monitor. Call bell is within reach, will continue to monitor. 2015 physical assessment, visiting with the family for a bit, they had a lot of questions about the zoll vest, I printed up some information for them on the vest website. In report I was told that patient was very tearful, I didn't discuss a lot with them, I just printed it for the family. I did discuss with them that he needs to make some life style changes and increase slowly with walking or light activity and quit smoking and eat healthier. Patient denies pain and denies shortenss of breath. His bp is slightly elevated however not greater than 180 so prn med will not be given and BP will be rechecked shortly. 2140 pt is in bed now watching tv, his visitors left and he is relaxing, blood pressure reassessed and now it is wnl. His evening medications were given and he did c/o  
2230 in bed meds given pain and needs assessed urinal emptied. Hourly round [address] In bed resting quietly. Hourly round pain and needs assessed. None currently. Will continue to assess. Call bell within reach. 2340 bedside turnover given to MIGUELITO Craft. Pt is in bed, bed in lowest position, 2 side rails up and call bell within reach.  SBAR, MAR, ED summary given and a chance to ask questions/

## 2019-01-16 NOTE — HOME CARE
Discharge planned for today. Received home health referral for [facility] for SN and MSW. Order processed and called to [facility] in intake.   Yolanda Torres LPN

## 2019-01-16 NOTE — ROUTINE PROCESS
Plan for discharge home. Bladder scan and urine culture collected per order. Dr. Jack Finney and Dr. Darvin Taylor into see patient to discuss refusal to wear life vest. Discharge instruction given question answered. Patient verbalized understanding. IV D/C.

## 2019-01-16 NOTE — PROGRESS NOTES
Patient stated to me that he does not want to get the life vest that the doctor has recommended for him. States that his son and daughter did some research and does not feel comfortable with the patient coming home with life vest. Re-iterated teaching on the benefits of the life vest and told the patient to make sure he spoke with the MD in the a.m regarding his concerns.

## 2019-01-16 NOTE — ROUTINE PROCESS
Bedside and Verbal shift change report given to Jaime Fonseca RN (oncoming nurse) by French Baumgarten, RN 
 (offgoing nurse). Report included the following information SBAR, Kardex, MAR, Recent Results and Cardiac Rhythm NSR. Fortunato Skelton

## 2019-01-16 NOTE — DISCHARGE INSTRUCTIONS
Patient Education        Pacemaker Placement for Heart Failure: Before Your Procedure  What is pacemaker placement for heart failure? A pacemaker for heart failure is a biventricular pacemaker (say \"bernabe-martin-TRICK-romeo-tony\"). Treatment that uses this type of pacemaker is called cardiac resynchronization therapy (CRT). When you have heart failure, the lower chambers of your heart may not pump at the same time. The pacemaker sends painless electrical signals to your heart. These signals make the chambers pump at the same time. This can help your heart pump blood better and help you feel better. Your pacemaker may be combined with an ICD, or implantable cardioverter-defibrillator. It can control abnormal heart rhythms. This can prevent sudden death. You will get medicine to help you relax and prevent pain. The doctor will make a cut in the skin just below your collarbone. The cut may be on either side of your chest. The doctor will put the pacemaker leads through the cut. The leads go into a large blood vessel in the upper chest. Then the doctor will guide the leads through the blood vessel into different chambers of the heart. The doctor will place the pacemaker under the skin of your chest. He or she will attach the leads to the pacemaker. Then the cut will be closed with stitches. The procedure usually takes 2 to 3 hours. You may need to spend the night in the hospital.  Pacemaker batteries usually last 5 to 15 years. Your doctor will tell you how often you will need to have your pacemaker and battery checked. You can likely return to many of your normal activities after your procedure. But to stay safe, you may need to make some changes in your normal routine. You may feel worried about having a pacemaker. This is common. You might feel better if you learn ways to relax. And it can help if you learn about how the pacemaker helps your heart. Talk to your doctor about your concerns.   Follow-up care is a key part of your treatment and safety. Be sure to make and go to all appointments, and call your doctor if you are having problems. It's also a good idea to know your test results and keep a list of the medicines you take. What happens before the procedure?   Preparing for the procedure    · Understand exactly what procedure is planned, along with the risks, benefits, and other options. · Tell your doctors ALL the medicines, vitamins, supplements, and herbal remedies you take. Some of these can increase the risk of bleeding or interact with anesthesia.     · If you take aspirin or some other blood thinner, be sure to talk to your doctor. He or she will tell you if you should stop taking these medicines before the procedure. Make sure that you understand exactly what your doctor wants you to do.     · Your doctor will tell you which medicines to take or stop before your procedure. You may need to stop taking certain medicines a week or more before the procedure. So talk to your doctor as soon as you can.     · If you have an advance directive, let your doctor know. It may include a living will and a durable power of  for health care. Bring a copy to the hospital. If you don't have one, you may want to prepare one. It lets your doctor and loved ones know your health care wishes. Doctors advise that everyone prepare these papers before any type of surgery or procedure. Procedures can be stressful. This information will help you understand what you can expect. And it will help you safely prepare for your procedure. What happens on the day of the procedure? · Follow the instructions exactly about when to stop eating and drinking. If you don't, your procedure may be canceled. If your doctor told you to take your medicines on the day of the procedure, take them with only a sip of water.     · Take a bath or shower before you come in for your procedure.  Do not apply lotions, perfumes, deodorants, or nail polish.     · Take off all jewelry and piercings. And take out contact lenses, if you wear them.    At the hospital or surgery center   · Bring a picture ID.     · You will be kept comfortable and safe by your anesthesia provider. You may get medicine that relaxes you or puts you in a light sleep. The area being worked on will be numb.     · The procedure will take 2 to 3 hours. Going home   · Be sure you have someone to drive you home. Anesthesia and pain medicine make it unsafe for you to drive.     · You will be given more specific instructions about recovering from your procedure. They will cover things like diet, wound care, follow-up care, driving, and getting back to your normal routine. When should you call your doctor? · You have questions or concerns.     · You don't understand how to prepare for your procedure.     · You become ill before the procedure (such as fever, flu, or a cold).     · You need to reschedule or have changed your mind about having the procedure. Where can you learn more? Go to http://jose-henrik.info/. Enter C440 in the search box to learn more about \"Pacemaker Placement for Heart Failure: Before Your Procedure. \"  Current as of: December 6, 2017  Content Version: 11.8  © 2355-1488 [employer]. Care instructions adapted under license by [employer] (which disclaims liability or warranty for this information). If you have questions about a medical condition or this instruction, always ask your healthcare professional. [address] any warranty or liability for your use of this information.

## 2019-01-16 NOTE — ROUTINE PROCESS
Bedside and Verbal shift change report given to Yazan Crandall RN (oncoming nurse) by Tigre Moeller RN 
 (offgoing nurse). Report included the following information SBAR, Kardex, MAR, Recent Results and Cardiac Rhythm NSR. Shahrzad Elizabeth

## 2019-01-16 NOTE — PROGRESS NOTES
[facility] - Progress Note Admit Date: 1/13/2019 Assessment:  
 
Hospital Problems  Date Reviewed: 7/26/2018 Codes Class Noted POA Lactic acidemia ICD-10-CM: E87.2 ICD-9-CM: 276.2  1/14/2019 Unknown * (Principal) Acute on chronic combined systolic and diastolic congestive heart failure (HCC) ICD-10-CM: I50.43 ICD-9-CM: 428.43, 428.0  1/13/2019 Unknown Tobacco abuse ICD-10-CM: Z72.0 ICD-9-CM: 305.1  1/13/2019 Unknown Acute renal failure (ARF) (HCC) ICD-10-CM: N17.9 ICD-9-CM: 584.9  1/13/2019 Unknown Non-compliance ICD-10-CM: Z91.19 ICD-9-CM: V15.81  1/13/2019 Unknown Acute pulmonary edema (HCC) ICD-10-CM: J81.0 ICD-9-CM: 518.4  1/13/2019 Unknown Acute respiratory distress ICD-10-CM: R06.03 
ICD-9-CM: 518.82  1/13/2019 Unknown Accelerated hypertension ICD-10-CM: I10 
ICD-9-CM: 401.0  1/13/2019 Unknown  
   
  
 
 
-Acute on chronic systolic heart failure, recent new diagnosis May 2018. He has [address] with EF 20%, restrictive pattern, grade 3 DD. -CAD two-vessel CAD by cath 1/14/2019 with moderate proximal circumflex 70%, mid RCA and distal RCA 70%. Severe proximal RCA  99% stenosis with LAUREN II flow. Successful angioplasty and stent of proximal RCA 99% residual 0% using CHINMAY. The other vessels were not intervene because of his renal function with creatinine 1.8. 
-Nonischemic cardiomyopathy. EF 20% by echo 5/2018. LV dysfunction out of proportion to CAD.  
-EKG with LVH and repolarization abnormality.  
-History of ongoing tobacco use. -Hypertension poorly controlled despite beta-blocker.  
-Family history of premature coronary artery disease. 
-Medication non-adherence 
-Right renal midpole large heterogeneous mass suspicious for renal carcinoma 
  
Primary cardiologist Dr. Radha Rosas Plan:  
 
Lifevest arranged yesterday but after patient discussed with family, they decided they did not want a lifevest. Family is not available this AM but would like to discuss AICD. I have discussed with patient that plan is for outpatient echo follow up and if EF remains depressed despite recent PCI and optimal heart failure management, AICD would be discussed at that time. Limited follow up echo is pending for this admission. Renal function stable, volume status improved, transitioned to maintenance lasix. BP overall improved, continued on coreg, hydral/imdur, norvasc. No ace or arb given renal function. Continued on ASA, statin, brilinta. Stable for discharge home from cardiac standpoint with close office follow up. Have explained the importance of compliance with medical regimen and follow up. Urology consult pending for renal mass. Subjective:  
 
Breathing stable, Typically SOB when first waking up but he reports it is stable after a few minutes. Objective:  
  
Patient Vitals for the past 8 hrs: 
 Temp Pulse Resp BP SpO2  
01/16/19 0618 97.5 °F (36.4 °C) 74 18 127/71 100 % Patient Vitals for the past 96 hrs: 
 Weight 01/15/19 2341 70.6 kg (155 lb 11.1 oz) 01/15/19 0413 70.5 kg (155 lb 8 oz) 01/14/19 0409 69 kg (152 lb 1.9 oz) 01/13/19 1720 70.3 kg (155 lb) Intake/Output Summary (Last 24 hours) at 1/16/2019 0754 Last data filed at 1/16/2019 0700 Gross per 24 hour Intake 720 ml Output 2800 ml Net -2080 ml Physical Exam: 
General:  alert, cooperative, no distress, appears stated age Neck:  no JVD Lungs:  clear to auscultation bilaterally Heart:  regular rate and rhythm Abdomen:  abdomen is soft without significant tenderness, masses, organomegaly or guarding Extremities:  extremities normal, atraumatic, no cyanosis or edema Data Review:  
 
Labs: Results:  
   
Chemistry Recent Labs  
  01/16/19 
0543 01/15/19 
0515 01/14/19 
0446 01/13/19 
1745 GLU 81 120* 149* 108*  140 141 141  
K 4.5 3.8 3.5 5.1 * 108 106 104 CO2 21 22 26 26 BUN 29* 24* 29* 28* CREA 1.44* 1.42* 1.84* 1.92* CA 8.5 8.3* 8.3* 8.7 MG  --  2.1 2.0  --   
PHOS  --   --  3.9  --   
AGAP 8 10 9 11 BUCR 20 17 16 15 AP  --   --   --  100 TP  --   --   --  8.0 ALB  --   --   --  3.0*  
GLOB  --   --   --  5.0* AGRAT  --   --   --  0.6* CBC w/Diff Recent Labs  
  01/16/19 
0543 01/15/19 
0515 01/14/19 
1876 WBC 6.6 7.1 5.9  
RBC 4.29* 3.89* 3.71* HGB 10.5* 9.6* 9.2* HCT 31.5* 28.5* 27.4*  
 248 248 GRANS 64 75* 69  
LYMPH 26 17* 23 EOS 1 1 0 Cardiac Enzymes No results found for: CPK, CK, CKMMB, CKMB, RCK3, CKMBT, CKNDX, CKND1, ROSAS, TROPT, TROIQ, PURVI, TROPT, TNIPOC, BNP, BNPP Coagulation Recent Labs  
  01/13/19 
1745 PTP 16.7* INR 1.4* Lipid Panel Lab Results Component Value Date/Time Cholesterol, total 68 01/14/2019 04:46 AM  
 HDL Cholesterol 20 (L) 01/14/2019 04:46 AM  
 LDL, calculated 36.4 01/14/2019 04:46 AM  
 VLDL, calculated 11.6 01/14/2019 04:46 AM  
 Triglyceride 58 01/14/2019 04:46 AM  
 CHOL/HDL Ratio 3.4 01/14/2019 04:46 AM  
  
BNP No results found for: BNP, BNPP, XBNPT Liver Enzymes Recent Labs  
  01/13/19 
1745 TP 8.0 ALB 3.0*  
 SGOT 99* Digoxin Thyroid Studies No results found for: T4, T3U, TSH, TSHEXT Signed By: DEBBY Barahona   
 January 16, 2019

## 2019-01-16 NOTE — NURSE NAVIGATOR
Discharge order noted for today. Talked with patient regarding Life Vest and patient still refuses despite knowing there is risk of sudden death. All prescriptions sent to OP pharmacy to get filled . Patient has selected [facility] for PCP - appt to be made. Patient also chose [facility] home care - [facility] signed and put in [facility]. Incentive Spirometer given to patient by nurse. Wife here to transport patient home once Rx ready. Lisette Whyte. Sourav, BSN, RN Care Management [phone]

## 2019-01-17 LAB
BACTERIA SPEC CULT: NORMAL
SERVICE CMNT-IMP: NORMAL

## 2019-01-18 ENCOUNTER — HOME CARE VISIT (OUTPATIENT)
Dept: SCHEDULING | Facility: HOME HEALTH | Age: 59
End: 2019-01-18
Payer: SUBSIDIZED

## 2019-01-18 VITALS
BODY MASS INDEX: 22.73 KG/M2 | HEART RATE: 94 BPM | SYSTOLIC BLOOD PRESSURE: 139 MMHG | OXYGEN SATURATION: 98 % | WEIGHT: 150 LBS | RESPIRATION RATE: 16 BRPM | DIASTOLIC BLOOD PRESSURE: 87 MMHG | TEMPERATURE: 98.2 F | HEIGHT: 68 IN

## 2019-01-18 PROCEDURE — G0495 RN CARE TRAIN/EDU IN HH: HCPCS

## 2019-01-18 PROCEDURE — 400013 HH SOC

## 2019-01-19 ENCOUNTER — HOME CARE VISIT (OUTPATIENT)
Dept: SCHEDULING | Facility: HOME HEALTH | Age: 59
End: 2019-01-19
Payer: SUBSIDIZED

## 2019-01-19 LAB
BACTERIA SPEC CULT: NORMAL
BACTERIA SPEC CULT: NORMAL
SERVICE CMNT-IMP: NORMAL
SERVICE CMNT-IMP: NORMAL

## 2019-01-21 ENCOUNTER — HOME CARE VISIT (OUTPATIENT)
Dept: HOME HEALTH SERVICES | Facility: HOME HEALTH | Age: 59
End: 2019-01-21
Payer: SUBSIDIZED

## 2019-01-23 ENCOUNTER — HOSPITAL ENCOUNTER (OUTPATIENT)
Dept: LAB | Age: 59
Discharge: HOME | End: 2019-01-23
Payer: SUBSIDIZED

## 2019-01-23 ENCOUNTER — OFFICE VISIT (OUTPATIENT)
Dept: FAMILY MEDICINE CLINIC | Age: 59
End: 2019-01-23

## 2019-01-23 VITALS
BODY MASS INDEX: 21.92 KG/M2 | TEMPERATURE: 97.8 F | RESPIRATION RATE: 18 BRPM | OXYGEN SATURATION: 100 % | HEART RATE: 88 BPM | SYSTOLIC BLOOD PRESSURE: 143 MMHG | DIASTOLIC BLOOD PRESSURE: 89 MMHG | WEIGHT: 144.6 LBS | HEIGHT: 68 IN

## 2019-01-23 DIAGNOSIS — N17.9 ACUTE RENAL FAILURE, UNSPECIFIED ACUTE RENAL FAILURE TYPE (HCC): ICD-10-CM

## 2019-01-23 DIAGNOSIS — I50.42 CHRONIC COMBINED SYSTOLIC AND DIASTOLIC CONGESTIVE HEART FAILURE (HCC): ICD-10-CM

## 2019-01-23 DIAGNOSIS — N28.89 KIDNEY MASS: ICD-10-CM

## 2019-01-23 DIAGNOSIS — I25.10 CORONARY ARTERY DISEASE INVOLVING NATIVE CORONARY ARTERY OF NATIVE HEART WITHOUT ANGINA PECTORIS: ICD-10-CM

## 2019-01-23 DIAGNOSIS — I10 ESSENTIAL HYPERTENSION: ICD-10-CM

## 2019-01-23 DIAGNOSIS — Z91.199 NON-COMPLIANCE: ICD-10-CM

## 2019-01-23 DIAGNOSIS — I50.42 CHRONIC COMBINED SYSTOLIC AND DIASTOLIC CONGESTIVE HEART FAILURE (HCC): Primary | ICD-10-CM

## 2019-01-23 DIAGNOSIS — Z72.0 TOBACCO ABUSE: ICD-10-CM

## 2019-01-23 LAB
ANION GAP SERPL CALC-SCNC: 12 MMOL/L (ref 3–18)
BASOPHILS # BLD: 0 K/UL (ref 0–0.1)
BASOPHILS NFR BLD: 0 % (ref 0–2)
BUN SERPL-MCNC: 25 MG/DL (ref 7–18)
BUN/CREAT SERPL: 17 (ref 12–20)
CALCIUM SERPL-MCNC: 9.6 MG/DL (ref 8.5–10.1)
CHLORIDE SERPL-SCNC: 102 MMOL/L (ref 100–108)
CO2 SERPL-SCNC: 25 MMOL/L (ref 21–32)
CREAT SERPL-MCNC: 1.45 MG/DL (ref 0.6–1.3)
DIFFERENTIAL METHOD BLD: ABNORMAL
EOSINOPHIL # BLD: 0.1 K/UL (ref 0–0.4)
EOSINOPHIL NFR BLD: 1 % (ref 0–5)
ERYTHROCYTE [DISTWIDTH] IN BLOOD BY AUTOMATED COUNT: 19.6 % (ref 11.6–14.5)
GLUCOSE SERPL-MCNC: 68 MG/DL (ref 74–99)
HCT VFR BLD AUTO: 35.9 % (ref 36–48)
HGB BLD-MCNC: 11.9 G/DL (ref 13–16)
LYMPHOCYTES # BLD: 2 K/UL (ref 0.9–3.6)
LYMPHOCYTES NFR BLD: 37 % (ref 21–52)
MCH RBC QN AUTO: 25.5 PG (ref 24–34)
MCHC RBC AUTO-ENTMCNC: 33.1 G/DL (ref 31–37)
MCV RBC AUTO: 76.9 FL (ref 74–97)
MONOCYTES # BLD: 0 K/UL (ref 0.05–1.2)
MONOCYTES NFR BLD: 0 % (ref 3–10)
NEUTS SEG # BLD: 3.3 K/UL (ref 1.8–8)
NEUTS SEG NFR BLD: 62 % (ref 40–73)
PLATELET # BLD AUTO: 272 K/UL (ref 135–420)
PMV BLD AUTO: 10.5 FL (ref 9.2–11.8)
POTASSIUM SERPL-SCNC: 4.9 MMOL/L (ref 3.5–5.5)
RBC # BLD AUTO: 4.67 M/UL (ref 4.7–5.5)
SODIUM SERPL-SCNC: 139 MMOL/L (ref 136–145)
WBC # BLD AUTO: 5.3 K/UL (ref 4.6–13.2)

## 2019-01-23 PROCEDURE — 85025 COMPLETE CBC W/AUTO DIFF WBC: CPT

## 2019-01-23 PROCEDURE — 80048 BASIC METABOLIC PNL TOTAL CA: CPT

## 2019-01-23 NOTE — PROGRESS NOTES
HISTORY OF PRESENT ILLNESS  Ivon Sanchez is a 66 y.o. male. HPI  Ivon Sanchez is a 66 y.o. male who presents to the office today to establish care. He is a new patient. He is here to follow up after admission to [facility] on 1/13/2019 for SOB. He was found to be in acute CHF. Cardiology was consulted. He was diuresed with IV lasix which helped improve his symptoms significantly. He underwent cardiac cath on 1/14/19 with the results listed below:    · Severe LV dysfunction with ejection fraction of 20%. Likely nonischemic etiology. · Two-vessel CAD with moderate proximal circumflex 70%, mid RCA and distal RCA 70%. Severe proximal RCA 99% stenosis with LAUREN II flow. · Successful angioplasty and stent of proximal RCA 99% residual 0% using CHINMAY. · The other vessels were not intervene because of his renal function with creatinine 1.8. He will have bilateral renal artery duplex scan to rule out renal artery stenosis. · Maximize medical therapy for nonischemic dilated cardiomyopathy. · He will need to be on dual antiplatelet therapy for at least one year. · If follow-up echocardiogram shows continued LV dysfunction, he will likely need consideration for AICD implantation. 2D Echo done on 1/16/19 which showed EF 21-25%, severe LVH, moderate AR. He declined LifeVest at discharge. He was started on ASA, statin and brilinta. He is not on ACEi or ARB given renal function. On Renal US he was found to have a right renal mass measuring 5.6 x 4.4 x 5.6 cm which was concerning for RCC. Urology was consulted and he was instructed to follow up as outpatient for additional imaging. Looking through his chart, it appears he missed his urology appointment. He states that he wants to work on his heart problems first and then will worry about his kidneys. He has a hx of smoking cigars, but says since discharge has stopped smoking. Since discharge his breathing is stable.  He has no SOB, can lay flat while sleeping which he was not able to due for a long time. Denies LE edema or weight gain. He admits to compliance with his medication. Chief Complaint   Patient presents with    CHF       Current Outpatient Medications on File Prior to Visit   Medication Sig Dispense Refill    aspirin 81 mg chewable tablet Take 1 Tab by mouth daily. 30 Tab 0    furosemide (LASIX) 40 mg tablet Take 1 Tab by mouth two (2) times a day. 60 Tab 0    amLODIPine (NORVASC) 10 mg tablet Take 1 Tab by mouth daily. 30 Tab 0    atorvastatin (LIPITOR) 40 mg tablet Take 1 Tab by mouth nightly. 30 Tab 0    carvedilol (COREG) 3.125 mg tablet Take 1 Tab by mouth two (2) times daily (with meals). 60 Tab 0    hydrALAZINE (APRESOLINE) 25 mg tablet Take 1 Tab by mouth three (3) times daily. 90 Tab 0    isosorbide mononitrate ER (IMDUR) 30 mg tablet Take 1 Tab by mouth daily. 30 Tab 0    nitroglycerin (NITROSTAT) 0.4 mg SL tablet 1 Tab by SubLINGual route every five (5) minutes as needed for Chest Pain. Up to 3 doses. 20 Tab 0    ticagrelor (BRILINTA) 90 mg tablet Take 1 Tab by mouth two (2) times a day. 60 Tab 0    docusate sodium (COLACE) 100 mg capsule Take 1 Cap by mouth two (2) times daily as needed for Constipation. 60 Cap 0    OTHER Check CBC, CMP, Mg in 3 days, results to PCP immediately, Diagnosis- CAD 1 Each 0    OTHER Incentive Spirometry- Use as directed 1 Each 0     No current facility-administered medications on file prior to visit.       Allergies   Allergen Reactions    Lopressor [Metoprolol Tartrate] Shortness of Breath     Past Medical History:   Diagnosis Date    Heart failure ([facility] [address]     Hypertension      Social History     Tobacco Use   Smoking Status Former Smoker    Packs/day: 0.25   Smokeless Tobacco Never Used     Social History     Substance and Sexual Activity   Alcohol Use No    Frequency: Never     Family History   Problem Relation Age of Onset    Heart Disease Father    Review of Systems   Constitutional: Negative for diaphoresis, malaise/fatigue and weight loss. Respiratory: Negative for cough, sputum production and shortness of breath. Cardiovascular: Negative for chest pain, palpitations, orthopnea, leg swelling and PND. Gastrointestinal: Negative for abdominal pain, nausea and vomiting. Neurological: Negative for dizziness, loss of consciousness and weakness. Endo/Heme/Allergies: Does not bruise/bleed easily. Visit Vitals  /89 (BP 1 Location: Left arm, BP Patient Position: Sitting)   Pulse 88   Temp 97.8 °F (36.6 °C) (Oral)   Resp 18   Ht 5' 8\" (1.727 m)   Wt 144 lb 9.6 oz (65.6 kg)   SpO2 100%   BMI 21.99 kg/m²     Physical Exam   Constitutional: He is oriented to person, place, and time. He appears well-developed and well-nourished. No distress. Neck: Neck supple. No JVD present. Cardiovascular: Normal rate, regular rhythm and normal heart sounds. Pulmonary/Chest: Effort normal and breath sounds normal. No respiratory distress. He has no wheezes. He has no rales. Abdominal: Soft. He exhibits no distension. There is no tenderness. Musculoskeletal: He exhibits no edema. Neurological: He is alert and oriented to person, place, and time. Skin: Skin is warm and dry. Psychiatric: He has a normal mood and affect. His behavior is normal. Thought content normal.   Nursing note and vitals reviewed. ASSESSMENT and PLAN    ICD-10-CM ICD-9-CM    1. Chronic combined systolic and diastolic congestive heart failure (HCC) I50.42 428.42 CBC WITH AUTOMATED DIFF     187.5 METABOLIC PANEL, BASIC   2. Acute renal failure, unspecified acute renal failure type ([facility] [address] N17.9 584.9 CBC WITH AUTOMATED DIFF      METABOLIC PANEL, BASIC   3. Coronary artery disease involving native coronary artery of native heart without angina pectoris I25.10 414.01    4. Kidney mass N28.89 593.9    5. Essential hypertension I10 401.9    6. Tobacco abuse Z72.0 305.1    7. Non-compliance Z91.19 V15.81       Rechecking labs today. BP fairly controlled. Admits to compliance with medication. Cannot rx ACEi or ARB due to kidney function. I have encouraged him to reschedule Urology appt, but he declines. He has Cardiology appt on 2/1/19 with Dr. Eleuterio Avila. Continue ASA, statin and Brilinta. Denies CP. Reviewed following a low sodium diet, daily morning weights. Call Cariology or our office if he gains 3lb in 24 hours or 5-6 lbs in one week. SOB and orthopnea have completely resolved. Continue with smoking cessation. He does not want to see Nephrology until his CHF is stable with cardiology. Reviewed medication and side effects. Patient agrees with the plan and verbalizes understanding. Follow-up Disposition:  Return in about 3 months (around 4/23/2019) for CHF.      Donnell Morrow PA-C  1/23/2019

## 2019-01-23 NOTE — PATIENT INSTRUCTIONS
Limiting Sodium and Fluids With Heart Failure: Care Instructions  Your Care Instructions    Sodium causes your body to hold on to extra water. This may cause your heart failure symptoms to get worse. Limiting sodium may help you feel better and lower your risk of having to go to the hospital.  People get most of their sodium from processed foods. Fast food and restaurant meals also tend to be very high in sodium. Your doctor may suggest that you limit sodium to 2,000 milligrams (mg) a day or less. That is less than 1 teaspoon of salt a day, including all the salt you eat in cooked or packaged foods. Usually, you have to limit the amount of liquids you drink only if your heart failure is severe. Limiting sodium alone often is enough to help your body get rid of extra fluids. However, your doctor may tell you to limit your fluid intake to a set amount each day. Follow-up care is a key part of your treatment and safety. Be sure to make and go to all appointments, and call your doctor if you are having problems. It's also a good idea to know your test results and keep a list of the medicines you take. How can you care for yourself at home? Read food labels  · Read food labels on cans and food packages. The labels tell you how much sodium is in each serving. Make sure that you look at the serving size. If you eat more than the serving size, you have eaten more sodium than is listed for one serving. · Food labels also tell you the Percent Daily Value. If the Percent Daily Value says 50%, it means that you will get at least 50% of all the sodium you need for the entire day in one serving. Choose products with low Percent Daily Values for sodium. · Be aware that sodium can come in forms other than salt, including monosodium glutamate (MSG), sodium citrate, and sodium bicarbonate (baking soda). MSG is often added to Asian food. You can sometimes ask for food without MSG or salt.   Buy low-sodium foods  · Buy foods that are labeled \"unsalted\" (no salt added), \"sodium-free\" (less than 5 mg of sodium per serving), or \"low-sodium\" (less than 140 mg of sodium per serving). A food labeled \"light sodium\" has less than half of the full-sodium version of that food. Foods labeled \"reduced-sodium\" may still have too much sodium. · Buy fresh vegetables or plain, frozen vegetables. Buy low-sodium versions of canned vegetables, soups, and other canned goods. Prepare low-sodium meals  · Use less salt each day when cooking. Reducing salt in this way will help you adjust to the taste. Do not add salt after cooking. Take the salt shaker off the table. · Flavor your food with garlic, lemon juice, onion, vinegar, herbs, and spices instead of salt. Do not use soy sauce, steak sauce, onion salt, garlic salt, mustard, or ketchup on your food. · Make your own salad dressings, sauces, and ketchup without adding salt. · Use less salt (or none) when recipes call for it. You can often use half the salt a recipe calls for without losing flavor. Other dishes like rice, pasta, and grains do not need added salt. · Rinse canned vegetables. This removes some--but not all--of the salt. · Avoid water that has a naturally high sodium content or that has been treated with water softeners, which add sodium. Call your local water company to find out the sodium content of your water supply. If you buy bottled water, read the label and choose a sodium-free brand. Avoid high-sodium foods, such as:  · Smoked, cured, salted, and canned meat, fish, and poultry. · Ham, jimenez, hot dogs, and luncheon meats. · Regular, hard, and processed cheese and regular peanut butter. · Crackers with salted tops. · Frozen prepared meals. · Canned and dried soups, broths, and bouillon, unless labeled sodium-free or low-sodium. · Canned vegetables, unless labeled sodium-free or low-sodium. · Salted snack foods such as chips and pretzels.   · Sheralyn Todd fries, pizza, tacos, and other fast foods. · Pickles, olives, ketchup, and other condiments, especially soy sauce, unless labeled sodium-free or low-sodium. If you cannot cook for yourself  · Have family members or friends help you, or have someone cook low-sodium meals. · Check with your local senior nutrition program to find out where meals are served and whether they offer a low-sodium option. You can often find these programs through your local health department or hospital.  · Have meals delivered to your home. Most [facility] have a Meals on [employer]. These programs provide one hot meal a day for older adults, delivered to their homes. Ask whether these meals are low-sodium. Let them know that you are on a low-sodium diet. Limiting fluid intake  · Find a method that works for you. You might simply write down how much you drink every time you do. Some people keep a container filled with the amount of fluid allowed for that day. If they drink from a source other than the container, then they pour out that amount. · Measure your regular drinking glasses to find out how much fluid each one holds. Once you know this, you will not have to measure every time. · Besides water, milk, juices, and other drinks, some foods have a lot of fluid. Count any foods that will melt (such as ice cream or gelatin dessert) or liquid foods (such as soup) as part of your fluid intake for the day. Where can you learn more? Go to http://jose-henrik.info/. Enter A166 in the search box to learn more about \"Limiting Sodium and Fluids With Heart Failure: Care Instructions. \"  Current as of: July 22, 2018  Content Version: 11.9  © 5627-0375 Healthwise, Incorporated. Care instructions adapted under license by [employer] (which disclaims liability or warranty for this information).  If you have questions about a medical condition or this instruction, always ask your healthcare professional. [address] any warranty or liability for your use of this information.

## 2019-01-23 NOTE — PROGRESS NOTES
Edin Cruz is a 66 y.o. male new pt establishing care. He was dx with CHF. Recently seen at [facility] ED for SOB. Learning assessment and PHQ2 completed.      Health Maintenance:  Flu Vac -  DECLINES  Pneumonia Vac - NEVER  Shingles Vac - NEVER  Tetanus - UNKNOWN  Fecal Occult - NEVER

## 2019-01-24 ENCOUNTER — HOME CARE VISIT (OUTPATIENT)
Dept: SCHEDULING | Facility: HOME HEALTH | Age: 59
End: 2019-01-24
Payer: SUBSIDIZED

## 2019-01-25 ENCOUNTER — HOME CARE VISIT (OUTPATIENT)
Dept: HOME HEALTH SERVICES | Facility: HOME HEALTH | Age: 59
End: 2019-01-25
Payer: SUBSIDIZED

## 2019-01-29 ENCOUNTER — TELEPHONE (OUTPATIENT)
Dept: CARDIAC REHAB | Age: 59
End: 2019-01-29

## 2019-01-29 NOTE — TELEPHONE ENCOUNTER
Cardiac Rehab called patient and left a message on his cell about the program. Additional attempts at contact will be made.     Thank you,  Garrel Kanner

## 2019-01-31 ENCOUNTER — HOME CARE VISIT (OUTPATIENT)
Dept: HOME HEALTH SERVICES | Facility: HOME HEALTH | Age: 59
End: 2019-01-31
Payer: SUBSIDIZED

## 2019-02-01 ENCOUNTER — OFFICE VISIT (OUTPATIENT)
Dept: CARDIOLOGY CLINIC | Age: 59
End: 2019-02-01

## 2019-02-01 VITALS
SYSTOLIC BLOOD PRESSURE: 130 MMHG | BODY MASS INDEX: 21.98 KG/M2 | OXYGEN SATURATION: 97 % | WEIGHT: 145 LBS | DIASTOLIC BLOOD PRESSURE: 76 MMHG | HEIGHT: 68 IN | HEART RATE: 89 BPM

## 2019-02-01 DIAGNOSIS — I10 ACCELERATED HYPERTENSION: ICD-10-CM

## 2019-02-01 DIAGNOSIS — R06.02 SOB (SHORTNESS OF BREATH): ICD-10-CM

## 2019-02-01 DIAGNOSIS — I50.43 ACUTE ON CHRONIC COMBINED SYSTOLIC AND DIASTOLIC CONGESTIVE HEART FAILURE (HCC): Primary | ICD-10-CM

## 2019-02-01 DIAGNOSIS — J81.0 ACUTE PULMONARY EDEMA (HCC): ICD-10-CM

## 2019-02-01 NOTE — PROGRESS NOTES
Linnea Vargas presents today for Chief Complaint Patient presents with  
[facility] Follow Up  
  follow up Linnea Vargas preferred language for health care discussion is english/other. Is someone accompanying this pt? no 
 
Is the patient using any DME equipment during OV? no 
 
Depression Screening: PHQ over the last two weeks 1/23/2019 Little interest or pleasure in doing things Not at all Feeling down, depressed, irritable, or hopeless Not at all Total Score PHQ 2 0 Learning Assessment: 
Learning Assessment 1/23/2019 PRIMARY LEARNER Patient HIGHEST LEVEL OF EDUCATION - PRIMARY LEARNER  DID NOT GRADUATE HIGH SCHOOL  
BARRIERS PRIMARY LEARNER NONE  
CO-LEARNER CAREGIVER No  
PRIMARY LANGUAGE ENGLISH  
LEARNER PREFERENCE PRIMARY LISTENING  
ANSWERED BY patient RELATIONSHIP SELF Abuse Screening: No flowsheet data found. Fall Risk No flowsheet data found. Pt currently taking Anticoagulant therapy? ASA 81mg daily & Brilinta Coordination of Care: 1. Have you been to the ER, urgent care clinic since your last visit? Hospitalized since your last visit? 1/13/19 - 1/14/19 for CAD 2. Have you seen or consulted any other health care providers outside of the [address] [place] [address] since your last visit? Include any pap smears or colon screening.  no

## 2019-02-01 NOTE — PROGRESS NOTES
History of Present Illness: A 66 y.o. male here for follow up. He was hospitalized and discharged January 2018. Since discharge he is doing well. He denies any chest pain or dyspnea. No dizziness or palpitations. There was discussion of possible Life Vest and he was worried about using this at home. There was also discussion of a tumor mass that was found on his kidney and there is a high suspicion for renal cell cancer based on the ultrasound findings. He did not go to the appointment as of yet as he wanted to get his heart straightened out first. Again, he is doing well today. He is taking Brilinta without bleeding issues.  
  
Impression/Plan:  
New diagnosis of acute chronic heart failure May 2018. Nonischemic cardiomyopathy with EF 20% with restrictive diastolic pattern on echo. CAD, two vessel by 1/14/19 with RCA stenosis 99% status post PCI with drug-eluting stent. He has circumflex proximal 70%, some distal RCA disease medically treated for now. History of tobacco use. Hypertension poorly controlled, now improved. History of medical nonadherence. Right renal heterogenous mass concerning for carcinoma. At this point I discussed his increased risk for arrhythmias, but he again, is not interested in a Life Vest. If his ejection fraction improves after 30 days with medical therapy, he will not require a permanent ICD. I discussed that he needs to follow up with urology and discuss options for identifying this renal mass which definitely could be carcinoma. His Brilinta could be stopped 2/14/19 for biopsy if necessary. He would like to stay out of work given his cardiomyopathy for now and I have given him a one-month leave of absence. I will have him see an advance practitioner in a month and return to light duty depending on how he is doing. Again, he needs a workup for this renal mass as it will affect long term options of AICD therapy. Past Medical History:  
Diagnosis Date  Heart failure ([facility] [place] [address]  Hypertension Current Outpatient Medications Medication Sig Dispense Refill  aspirin 81 mg chewable tablet Take 1 Tab by mouth daily. 30 Tab 0  
 furosemide (LASIX) 40 mg tablet Take 1 Tab by mouth two (2) times a day. 60 Tab 0  
 amLODIPine (NORVASC) 10 mg tablet Take 1 Tab by mouth daily. 30 Tab 0  
 atorvastatin (LIPITOR) 40 mg tablet Take 1 Tab by mouth nightly. 30 Tab 0  carvedilol (COREG) 3.125 mg tablet Take 1 Tab by mouth two (2) times daily (with meals). 60 Tab 0  
 docusate sodium (COLACE) 100 mg capsule Take 1 Cap by mouth two (2) times daily as needed for Constipation. 60 Cap 0  
 hydrALAZINE (APRESOLINE) 25 mg tablet Take 1 Tab by mouth three (3) times daily. 90 Tab 0  
 isosorbide mononitrate ER (IMDUR) 30 mg tablet Take 1 Tab by mouth daily. 30 Tab 0  
 OTHER Check CBC, CMP, Mg in 3 days, results to PCP immediately, Diagnosis- CAD 1 Each 0  
 OTHER Incentive Spirometry- Use as directed 1 Each 0  
 nitroglycerin (NITROSTAT) 0.4 mg SL tablet 1 Tab by SubLINGual route every five (5) minutes as needed for Chest Pain. Up to 3 doses. 20 Tab 0  
 ticagrelor (BRILINTA) 90 mg tablet Take 1 Tab by mouth two (2) times a day. 60 Tab 0 Social History 
 reports that he has quit smoking. He smoked 0.25 packs per day. he has never used smokeless tobacco. 
 reports that he does not drink alcohol. Family History 
family history includes Heart Disease in his father. Review of Systems Except as stated above include: 
Constitutional: Negative for fever, chills and malaise/fatigue. HEENT: No congestion or recent URI. Gastrointestinal: No nausea, vomiting, abdominal pain, bloody stools. Pulmonary:  Negative except as stated above. Cardiac:  Negative except as stated above. Musculoskeletal: Negative except as stated above. Neurological:  No localized symptoms. Skin:  Negative except as stated above. Psych:  Negative except as stated above. Endocrine:  Negative except as stated above. PHYSICAL EXAM 
BP Readings from Last 3 Encounters:  
01/23/19 143/89  
01/18/19 139/87  
01/16/19 125/85 Pulse Readings from Last 3 Encounters:  
01/23/19 88  
01/18/19 94  
01/16/19 93 Wt Readings from Last 3 Encounters:  
01/23/19 65.6 kg (144 lb 9.6 oz) 01/18/19 68 kg (150 lb) 01/16/19 70.3 kg (155 lb) General:   Well developed, well groomed. Head/Neck:   No jugular venous distention No carotid bruits. No evidence of xanthelasma. Lungs:   No respiratory distress. Clear bilaterally. Heart:    Regular rate and rhythm. Normal S1/S2. Palpation of heart with normal point of maximum impulse. No significant murmurs, rubs or gallops. Abdomen:   Soft and nontender. No palpable abdominal mass or bruits. Extremities:   Intact peripheral pulses. No significant edema. Neurological:   Alert and oriented to person, place, time. No focal neurological deficit visually. Skin:   No obvious rash Blood Pressure Metric: 
Fito Beth has been given the following recommendations today due to his elevated BP reading: monitor

## 2019-02-01 NOTE — LETTER
NOTIFICATION OF RETURN TO WORK / SCHOOL 
 
2019 10:21 AM 
 
[facility] [id number] [facility] [id number]  64 To Whom It May Concern: 
 
Lauren Molina is under the care of [address] [place] [address] . He is currently unable to return to work at this time. Mr. Herminio Dos Santos will be reevaluated in 1 month. If there are questions or concerns please have the patient contact our office. Sincerely,       Jyoti Vang MD

## 2019-02-02 ENCOUNTER — HOME CARE VISIT (OUTPATIENT)
Dept: SCHEDULING | Facility: HOME HEALTH | Age: 59
End: 2019-02-02
Payer: SUBSIDIZED

## 2019-02-05 NOTE — DISCHARGE SUMMARY
[address] [place] [address] Dr DISCHARGE SUMMARY Andra Mistry 
MR#: [id number] : 1960 ACCOUNT #: [de-identified] ADMIT DATE: 2019 DISCHARGE DATE: 2019 DISCHARGE DIAGNOSES: 
1. Acute-on-chronic systolic congestive heart failure exacerbation. 2.  Ischemic cardiomyopathy, ejection fraction 20%. 3.  Coronary artery disease. 4.  Tobacco abuse. 5.  Hypertensive urgency at admission 6. History of noncompliance. 7.  Acute kidney injury. 8.  Right renal mass with concern for renal cell cancer. HOSPITAL COURSE:  This is a 72-year-old male who presented to the ER with shortness of breath. The patient was evaluated. The patient was felt to have a congestive heart failure exacerbation. The patient was started on IV diuretics. Cardiology was consulted. I's and O's were monitored. The patient's breathing showed improvement. Renal function was monitored. Patient was mobilized. Cardiology cleared the patient for discharge. The patient had a low ejection fraction. Cardiology recommended LifeVest.  Patient refused LifeVest.  The risks versus benefits of the LifeVest were discussed with the patient. The patient verbalized understanding. The patient understands the risk of death by declining a LifeVest.  The patient had a renal ultrasound which was concerning for renal mass and concerning for renal cell cancer. Urology was consulted and they recommended outpatient followup in the office. Discharge plans were discussed with the patient at length. I counseled the patient at length regarding compliance with medications and regular followup with physician and following physician instructions. The patient verbalized understanding and agreed. I also specifically discussed with the patient the concern for renal cell cancer and the critical need for the patient to follow up with the urologist as an outpatient.   Patient verbalized understanding and agreed. Patient understood the risks of the spread of cancer and worsening of his condition if he fails to follow up with the urologist.  I also counseled the patient regarding smoking cessation. The patient verbalized understanding and agreed. The patient was cleared for discharge. Discharge plans were discussed with the patient. DISPOSITION:  Home. CONSULTATIONS:  With Cardiology and with Urology. PROCEDURES:  The patient underwent a cardiac catheterization and had a stent placed and was started on dual antiplatelet therapy. This was discussed with the patient and he understands the importance of taking his dual antiplatelet medications regularly. CONDITION:  Patient is hemodynamically stable at the time of discharge. DISCHARGE MEDICATIONS:  Include: 1. Aspirin 81 mg p.o. daily. 2.  Lasix 40 mg p.o. twice daily. 3.  Amlodipine 10 mg p.o. daily. 4.  Lipitor 40 mg p.o. daily. 5.  Coreg 3.125 mg p.o. twice daily. 6.  Colace 100 mg p.o. twice daily. 7.  Hydralazine 25 mg p.o. 3 times daily. 8.  Imdur 30 mg p.o. daily. 9.  Check CBC, CMP, magnesium in 3 days, results to PCP immediately. 10.  Incentive spirometry, use as directed. 11.  Sublingual nitroglycerin p.r.n. chest pain. 12.  Brilinta 90 mg p.o. twice daily. Total time for the discharge is more than 35 minutes. Marcie Alexander MD 
  
 
VT/LN 
D: 02/04/2019 11:58    
T: 02/04/2019 20:04 JOB #: [id number]

## 2019-02-07 ENCOUNTER — HOME CARE VISIT (OUTPATIENT)
Dept: HOME HEALTH SERVICES | Facility: HOME HEALTH | Age: 59
End: 2019-02-07
Payer: SUBSIDIZED

## 2019-02-13 RX ORDER — ATORVASTATIN CALCIUM 40 MG/1
40 TABLET, FILM COATED ORAL
Qty: 30 TAB | Refills: 4 | Status: SHIPPED | OUTPATIENT
Start: 2019-02-13 | End: 2019-02-18 | Stop reason: SDUPTHER

## 2019-02-13 RX ORDER — GUAIFENESIN 100 MG/5ML
81 LIQUID (ML) ORAL DAILY
Qty: 30 TAB | Refills: 4 | Status: SHIPPED | OUTPATIENT
Start: 2019-02-13 | End: 2019-02-18 | Stop reason: SDUPTHER

## 2019-02-13 RX ORDER — HYDRALAZINE HYDROCHLORIDE 25 MG/1
25 TABLET, FILM COATED ORAL 3 TIMES DAILY
Qty: 90 TAB | Refills: 4 | Status: SHIPPED | OUTPATIENT
Start: 2019-02-13 | End: 2019-02-18 | Stop reason: SDUPTHER

## 2019-02-13 RX ORDER — FUROSEMIDE 40 MG/1
40 TABLET ORAL 2 TIMES DAILY
Qty: 60 TAB | Refills: 4 | Status: SHIPPED | OUTPATIENT
Start: 2019-02-13 | End: 2019-02-18 | Stop reason: SDUPTHER

## 2019-02-13 RX ORDER — NITROGLYCERIN 0.4 MG/1
0.4 TABLET SUBLINGUAL
Qty: 20 TAB | Refills: 0 | Status: SHIPPED | OUTPATIENT
Start: 2019-02-13 | End: 2019-11-14 | Stop reason: SDUPTHER

## 2019-02-13 RX ORDER — AMLODIPINE BESYLATE 10 MG/1
10 TABLET ORAL DAILY
Qty: 30 TAB | Refills: 4 | Status: SHIPPED | OUTPATIENT
Start: 2019-02-13 | End: 2019-02-18 | Stop reason: SDUPTHER

## 2019-02-13 RX ORDER — CARVEDILOL 3.12 MG/1
3.12 TABLET ORAL 2 TIMES DAILY WITH MEALS
Qty: 60 TAB | Refills: 4 | Status: SHIPPED | OUTPATIENT
Start: 2019-02-13 | End: 2019-02-18 | Stop reason: SDUPTHER

## 2019-02-18 ENCOUNTER — TELEPHONE (OUTPATIENT)
Dept: CARDIOLOGY CLINIC | Age: 59
End: 2019-02-18

## 2019-02-18 RX ORDER — ATORVASTATIN CALCIUM 40 MG/1
40 TABLET, FILM COATED ORAL
Qty: 30 TAB | Refills: 4 | Status: SHIPPED | OUTPATIENT
Start: 2019-02-18 | End: 2019-08-22 | Stop reason: SDUPTHER

## 2019-02-18 RX ORDER — AMLODIPINE BESYLATE 10 MG/1
10 TABLET ORAL DAILY
Qty: 30 TAB | Refills: 4 | Status: SHIPPED | OUTPATIENT
Start: 2019-02-18 | End: 2019-11-14 | Stop reason: SDUPTHER

## 2019-02-18 RX ORDER — CLOPIDOGREL BISULFATE 75 MG/1
75 TABLET ORAL DAILY
Qty: 30 TAB | Refills: 6 | Status: CANCELLED | OUTPATIENT
Start: 2019-02-18

## 2019-02-18 RX ORDER — AMLODIPINE BESYLATE 10 MG/1
10 TABLET ORAL DAILY
Qty: 30 TAB | Refills: 4 | Status: SHIPPED | OUTPATIENT
Start: 2019-02-18 | End: 2019-02-18 | Stop reason: SDUPTHER

## 2019-02-18 RX ORDER — CLOPIDOGREL BISULFATE 75 MG/1
75 TABLET ORAL DAILY
Qty: 30 TAB | Refills: 6 | Status: SHIPPED | OUTPATIENT
Start: 2019-02-18 | End: 2019-10-23 | Stop reason: SDUPTHER

## 2019-02-18 RX ORDER — CARVEDILOL 3.12 MG/1
3.12 TABLET ORAL 2 TIMES DAILY WITH MEALS
Qty: 60 TAB | Refills: 4 | Status: SHIPPED | OUTPATIENT
Start: 2019-02-18 | End: 2019-02-18 | Stop reason: SDUPTHER

## 2019-02-18 RX ORDER — FUROSEMIDE 40 MG/1
40 TABLET ORAL 2 TIMES DAILY
Qty: 60 TAB | Refills: 4 | Status: SHIPPED | OUTPATIENT
Start: 2019-02-18 | End: 2019-11-14 | Stop reason: SDUPTHER

## 2019-02-18 RX ORDER — FUROSEMIDE 40 MG/1
40 TABLET ORAL 2 TIMES DAILY
Qty: 60 TAB | Refills: 4 | Status: SHIPPED | OUTPATIENT
Start: 2019-02-18 | End: 2019-02-18 | Stop reason: SDUPTHER

## 2019-02-18 RX ORDER — ISOSORBIDE MONONITRATE 30 MG/1
30 TABLET, EXTENDED RELEASE ORAL DAILY
Qty: 30 TAB | Refills: 0 | Status: SHIPPED | OUTPATIENT
Start: 2019-02-18 | End: 2019-04-01 | Stop reason: SDUPTHER

## 2019-02-18 RX ORDER — GUAIFENESIN 100 MG/5ML
81 LIQUID (ML) ORAL DAILY
Qty: 30 TAB | Refills: 4 | Status: SHIPPED | OUTPATIENT
Start: 2019-02-18 | End: 2022-01-01

## 2019-02-18 RX ORDER — ATORVASTATIN CALCIUM 40 MG/1
40 TABLET, FILM COATED ORAL
Qty: 30 TAB | Refills: 4 | Status: SHIPPED | OUTPATIENT
Start: 2019-02-18 | End: 2019-02-18 | Stop reason: SDUPTHER

## 2019-02-18 RX ORDER — HYDRALAZINE HYDROCHLORIDE 25 MG/1
25 TABLET, FILM COATED ORAL 3 TIMES DAILY
Qty: 90 TAB | Refills: 4 | Status: SHIPPED | OUTPATIENT
Start: 2019-02-18 | End: 2019-06-13

## 2019-02-18 RX ORDER — CARVEDILOL 3.12 MG/1
3.12 TABLET ORAL 2 TIMES DAILY WITH MEALS
Qty: 60 TAB | Refills: 4 | Status: SHIPPED | OUTPATIENT
Start: 2019-02-18 | End: 2019-08-22 | Stop reason: SDUPTHER

## 2019-02-18 RX ORDER — ISOSORBIDE MONONITRATE 30 MG/1
30 TABLET, EXTENDED RELEASE ORAL DAILY
Qty: 30 TAB | Refills: 0 | Status: SHIPPED | OUTPATIENT
Start: 2019-02-18 | End: 2019-02-18 | Stop reason: SDUPTHER

## 2019-02-18 RX ORDER — HYDRALAZINE HYDROCHLORIDE 25 MG/1
25 TABLET, FILM COATED ORAL 3 TIMES DAILY
Qty: 90 TAB | Refills: 4 | Status: SHIPPED | OUTPATIENT
Start: 2019-02-18 | End: 2019-02-18 | Stop reason: SDUPTHER

## 2019-02-18 NOTE — TELEPHONE ENCOUNTER
Patient's wife called to report that Yulia Gone is too expense to afford. He was getting it free thru the foundation, but they are under the impression he has insurance now, (which has not taken affect yet) and can no longer get it from the [place] [id number] and can not afford it otherwise. Will consult Dr Eleuterio Avila about medication options.

## 2019-02-18 NOTE — TELEPHONE ENCOUNTER
MD Kevin Anaya LPN   Caller: Unspecified (Today, 11:55 AM)             Yes, please switch to plavix 75 mg daily.  thx

## 2019-02-26 ENCOUNTER — TELEPHONE (OUTPATIENT)
Dept: CARDIAC REHAB | Age: 59
End: 2019-02-26

## 2019-02-26 NOTE — TELEPHONE ENCOUNTER
[facility] called patient and spoke to him about the program. He quickly declined services and stated that he did not need the program.    Thank you,  Angeline Queen

## 2019-04-01 RX ORDER — ISOSORBIDE MONONITRATE 30 MG/1
30 TABLET, EXTENDED RELEASE ORAL DAILY
Qty: 30 TAB | Refills: 0 | Status: SHIPPED | OUTPATIENT
Start: 2019-04-01 | End: 2019-06-19 | Stop reason: SDUPTHER

## 2019-06-13 ENCOUNTER — OFFICE VISIT (OUTPATIENT)
Dept: CARDIOLOGY CLINIC | Age: 59
End: 2019-06-13

## 2019-06-13 VITALS
SYSTOLIC BLOOD PRESSURE: 180 MMHG | OXYGEN SATURATION: 95 % | HEART RATE: 88 BPM | DIASTOLIC BLOOD PRESSURE: 122 MMHG | BODY MASS INDEX: 25.16 KG/M2 | WEIGHT: 166 LBS | HEIGHT: 68 IN

## 2019-06-13 DIAGNOSIS — I10 ACCELERATED HYPERTENSION: ICD-10-CM

## 2019-06-13 DIAGNOSIS — R06.02 SOB (SHORTNESS OF BREATH): ICD-10-CM

## 2019-06-13 DIAGNOSIS — I50.22 CHRONIC SYSTOLIC CONGESTIVE HEART FAILURE (HCC): Primary | ICD-10-CM

## 2019-06-13 DIAGNOSIS — I42.9 CARDIOMYOPATHY, UNSPECIFIED TYPE (HCC): ICD-10-CM

## 2019-06-13 DIAGNOSIS — N28.89 RENAL MASS: ICD-10-CM

## 2019-06-13 RX ORDER — HYDRALAZINE HYDROCHLORIDE 50 MG/1
50 TABLET, FILM COATED ORAL 3 TIMES DAILY
Qty: 90 TAB | Refills: 6 | Status: SHIPPED | OUTPATIENT
Start: 2019-06-13 | End: 2019-11-14 | Stop reason: SDUPTHER

## 2019-06-13 NOTE — PROGRESS NOTES
HPI: A 60-year old male here for follow up. He was hospitalized and discharged January 2018 with new heart failure, cardiomyopathy status post stenting as well as a right renal mass concerning for carcinoma. He is in very good spirits today and keeps an eye on his weight and tries to take all medications as best he can which he gets [facility]. He has had issues with finances and he cannot afford to see urology at this time. He also has a hard time seeing me but given his heart condition, he is here for follow up. He denies any chest pain. He has very minimal dyspnea. He denies significant weight change, edema or hematuria. He has not yet seen urology. Impression/Plan: 1. 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 
2. Chronic Class II systolic heart failure. 3. Coronary artery disease, two-vessel by heart cath January 2019 with RCA stenosis status post PCI with drug-eluting stent, left circumflex proximal 70% medically treated. 4. History of tobacco use. 5. Hypertension, poorly controlled. 6. Right renal mass concerning for carcinoma, however, workup at this point is incomplete. 7. Ongoing financial difficulties limiting workup. At this point ideally he should be with a Life Vest and repeating an echocardiogram as well as have a continued workup for this renal mass which clearly could be carcinoma. Despite all of these issues, he is in good spirits and his heart failure is very compensated. His blood pressure is elevated today and I am going to increase his Hydralazine. We talked about referral to urology as well as repeat echocardiogram when he feels he is able to financially afford this which I did offer to him. Again due to financial constraints, he would like to hold off. I will see him back in four months. He is in the process of reapplying for disability as well as Medicaid. Past Medical History:  
Diagnosis Date  Heart failure ([address]  Hypertension Current Outpatient Medications Medication Sig Dispense Refill  isosorbide mononitrate ER (IMDUR) 30 mg tablet Take 1 Tab by mouth daily. 30 Tab 0  
 aspirin 81 mg chewable tablet Take 1 Tab by mouth daily. 30 Tab 4  clopidogrel (PLAVIX) 75 mg tab Take 1 Tab by mouth daily. 30 Tab 6  
 amLODIPine (NORVASC) 10 mg tablet Take 1 Tab by mouth daily. 30 Tab 4  
 atorvastatin (LIPITOR) 40 mg tablet Take 1 Tab by mouth nightly. 30 Tab 4  carvedilol (COREG) 3.125 mg tablet Take 1 Tab by mouth two (2) times daily (with meals). 60 Tab 4  furosemide (LASIX) 40 mg tablet Take 1 Tab by mouth two (2) times a day. 60 Tab 4  
 hydrALAZINE (APRESOLINE) 25 mg tablet Take 1 Tab by mouth three (3) times daily. 90 Tab 4  
 nitroglycerin (NITROSTAT) 0.4 mg SL tablet 1 Tab by SubLINGual route every five (5) minutes as needed for Chest Pain. Up to 3 doses. 20 Tab 0  
 docusate sodium (COLACE) 100 mg capsule Take 1 Cap by mouth two (2) times daily as needed for Constipation. 60 Cap 0  
 OTHER Check CBC, CMP, Mg in 3 days, results to PCP immediately, Diagnosis- CAD 1 Each 0  
 OTHER Incentive Spirometry- Use as directed 1 Each 0 Social History 
 reports that he has quit smoking. He smoked 0.25 packs per day. He has never used smokeless tobacco. 
 reports that he does not drink alcohol. Family History 
family history includes Heart Disease in his father. Review of Systems Except as stated above include: 
Constitutional: Negative for fever, chills and malaise/fatigue. HEENT: No congestion or recent URI. Gastrointestinal: No nausea, vomiting, abdominal pain, bloody stools. Pulmonary:  Negative except as stated above. Cardiac:  Negative except as stated above. Musculoskeletal: Negative except as stated above. Neurological:  No localized symptoms. Skin:  Negative except as stated above. Psych:  Negative except as stated above. Endocrine:  Negative except as stated above. PHYSICAL EXAM 
BP Readings from Last 3 Encounters:  
06/13/19 (!) 180/122  
02/01/19 130/76  
01/23/19 143/89 Pulse Readings from Last 3 Encounters:  
06/13/19 88  
02/01/19 89  
01/23/19 88 Wt Readings from Last 3 Encounters:  
06/13/19 75.3 kg (166 lb) 02/01/19 65.8 kg (145 lb)  
01/23/19 65.6 kg (144 lb 9.6 oz) General:   Well developed, well groomed. Head/Neck:   No jugular venous distention No carotid bruits. No evidence of xanthelasma. Lungs:   No respiratory distress. Clear bilaterally. Heart:    Regular rate and rhythm. Normal S1/S2. Palpation of heart with normal point of maximum impulse. No significant murmurs, rubs or gallops. Abdomen:   Soft and nontender. No palpable abdominal mass or bruits. Extremities:   Intact peripheral pulses. No significant edema. Neurological:   Alert and oriented to person, place, time. No focal neurological deficit visually. Skin:   No obvious rash Blood Pressure Metric: 
Monitor recommended and adjustments stated if needed.

## 2019-06-19 RX ORDER — ISOSORBIDE MONONITRATE 30 MG/1
TABLET, EXTENDED RELEASE ORAL
Qty: 30 TAB | Refills: 0 | Status: SHIPPED | OUTPATIENT
Start: 2019-06-19 | End: 2019-07-23 | Stop reason: SDUPTHER

## 2019-07-23 RX ORDER — ISOSORBIDE MONONITRATE 30 MG/1
TABLET, EXTENDED RELEASE ORAL
Qty: 30 TAB | Refills: 0 | Status: SHIPPED | OUTPATIENT
Start: 2019-07-23 | End: 2019-08-22 | Stop reason: SDUPTHER

## 2019-08-22 RX ORDER — CARVEDILOL 3.12 MG/1
3.12 TABLET ORAL 2 TIMES DAILY WITH MEALS
Qty: 60 TAB | Refills: 6 | Status: SHIPPED | OUTPATIENT
Start: 2019-08-22 | End: 2019-10-23 | Stop reason: SDUPTHER

## 2019-08-22 RX ORDER — ATORVASTATIN CALCIUM 40 MG/1
40 TABLET, FILM COATED ORAL
Qty: 30 TAB | Refills: 6 | Status: SHIPPED | OUTPATIENT
Start: 2019-08-22 | End: 2019-11-14 | Stop reason: SDUPTHER

## 2019-08-22 RX ORDER — ISOSORBIDE MONONITRATE 30 MG/1
TABLET, EXTENDED RELEASE ORAL
Qty: 90 TAB | Refills: 3 | Status: SHIPPED | OUTPATIENT
Start: 2019-08-22 | End: 2019-11-14 | Stop reason: SDUPTHER

## 2019-10-23 RX ORDER — CARVEDILOL 3.12 MG/1
3.12 TABLET ORAL 2 TIMES DAILY WITH MEALS
Qty: 60 TAB | Refills: 6 | Status: SHIPPED | OUTPATIENT
Start: 2019-10-23 | End: 2019-11-14 | Stop reason: SDUPTHER

## 2019-10-23 RX ORDER — CLOPIDOGREL BISULFATE 75 MG/1
75 TABLET ORAL DAILY
Qty: 30 TAB | Refills: 6 | Status: SHIPPED | OUTPATIENT
Start: 2019-10-23 | End: 2019-11-14 | Stop reason: SDUPTHER

## 2019-11-14 ENCOUNTER — OFFICE VISIT (OUTPATIENT)
Dept: CARDIOLOGY CLINIC | Age: 59
End: 2019-11-14

## 2019-11-14 VITALS
DIASTOLIC BLOOD PRESSURE: 140 MMHG | HEIGHT: 68 IN | WEIGHT: 178 LBS | BODY MASS INDEX: 26.98 KG/M2 | HEART RATE: 77 BPM | OXYGEN SATURATION: 98 % | SYSTOLIC BLOOD PRESSURE: 200 MMHG

## 2019-11-14 DIAGNOSIS — I10 ESSENTIAL HYPERTENSION: ICD-10-CM

## 2019-11-14 DIAGNOSIS — I42.9 CARDIOMYOPATHY, UNSPECIFIED TYPE (HCC): ICD-10-CM

## 2019-11-14 DIAGNOSIS — I50.22 CHRONIC SYSTOLIC CONGESTIVE HEART FAILURE (HCC): Primary | ICD-10-CM

## 2019-11-14 DIAGNOSIS — R06.02 SOB (SHORTNESS OF BREATH): ICD-10-CM

## 2019-11-14 RX ORDER — ATORVASTATIN CALCIUM 40 MG/1
40 TABLET, FILM COATED ORAL
Qty: 30 TAB | Refills: 6 | Status: SHIPPED | OUTPATIENT
Start: 2019-11-14 | End: 2020-08-10 | Stop reason: SDUPTHER

## 2019-11-14 RX ORDER — ISOSORBIDE MONONITRATE 30 MG/1
TABLET, EXTENDED RELEASE ORAL
Qty: 90 TAB | Refills: 3 | Status: SHIPPED | OUTPATIENT
Start: 2019-11-14 | End: 2020-08-10 | Stop reason: SDUPTHER

## 2019-11-14 RX ORDER — AMLODIPINE BESYLATE 10 MG/1
10 TABLET ORAL DAILY
Qty: 30 TAB | Refills: 4 | Status: SHIPPED | OUTPATIENT
Start: 2019-11-14 | End: 2020-04-27

## 2019-11-14 RX ORDER — CARVEDILOL 6.25 MG/1
6.25 TABLET ORAL 2 TIMES DAILY WITH MEALS
Qty: 60 TAB | Refills: 5 | Status: SHIPPED | OUTPATIENT
Start: 2019-11-14 | End: 2020-05-31

## 2019-11-14 RX ORDER — HYDRALAZINE HYDROCHLORIDE 50 MG/1
50 TABLET, FILM COATED ORAL 3 TIMES DAILY
Qty: 90 TAB | Refills: 6 | Status: SHIPPED | OUTPATIENT
Start: 2019-11-14 | End: 2020-04-27

## 2019-11-14 RX ORDER — FUROSEMIDE 40 MG/1
40 TABLET ORAL 2 TIMES DAILY
Qty: 60 TAB | Refills: 4 | Status: SHIPPED | OUTPATIENT
Start: 2019-11-14 | End: 2020-08-10 | Stop reason: SDUPTHER

## 2019-11-14 RX ORDER — NITROGLYCERIN 0.4 MG/1
0.4 TABLET SUBLINGUAL
Qty: 25 TAB | Refills: 3 | Status: SHIPPED | OUTPATIENT
Start: 2019-11-14 | End: 2022-01-01

## 2019-11-14 RX ORDER — CLOPIDOGREL BISULFATE 75 MG/1
75 TABLET ORAL DAILY
Qty: 30 TAB | Refills: 6 | Status: SHIPPED | OUTPATIENT
Start: 2019-11-14 | End: 2020-08-10 | Stop reason: SDUPTHER

## 2019-11-14 NOTE — PROGRESS NOTES
HPI: A 60-year old male here for follow up. I last saw him June 2019. He was hospitalized January 2019 with new heart failure and cardiomyopathy, coronary artery disease status post stenting, right renal mass concerning for carcinoma. He has been doing well taking his medications although his systolic blood pressure is 200 mmHg today. He has had no symptoms of chest pain, dyspnea, PND, orthopnea, edema. He has been monitoring his urine stream and no hematuria or problems with urination. He is in the process of obtaining disability through [employer] and should have insurance in place hopefully in about 90 days. He is getting his medications from [facility]. Impression/Plan:  1. Mixed cardiomyopathy ischemic and nonischemic with EF 20% January 2019 restrictive pattern by echocardiogram.  2. Chronic Class II systolic heart failure currently compensated. 3. Coronary artery disease by heart catheterization January 2019 with RCA, PCI at that time and proximal 70% circumflex medically treated. 4. History of tobacco use. 5. Hypertension, poorly controlled. 6. Right renal mass concerning for carcinoma by ultrasound January 2019, however not yet followed up with urology. 7. Ongoing financial difficulties limiting workup, now on disability. Once again ideally based on his history, Life Vest would be optimal and he needs a workup for his renal mass. His blood pressure is elevated today, but no symptoms. I am going to increase his Coreg. I will plan to see him back within the next few months. At that time, I am going to repeat an echocardiogram and will refer directly to urology to evaluate the renal mass. He understands that there could be a small chance that this could be cancer. His weight is stable. He will also require blood work at next visit.      Past Medical History:   Diagnosis Date    Heart failure ([address]     Hypertension        Current Outpatient Medications   Medication Sig Dispense Refill    carvedilol (COREG) 3.125 mg tablet Take 1 Tab by mouth two (2) times daily (with meals). 60 Tab 6    clopidogrel (PLAVIX) 75 mg tab Take 1 Tab by mouth daily. 30 Tab 6    furosemide (LASIX) 40 mg tablet TAKE 1 TABLET BY MOUTH TWICE DAILY 60 Tab 6    isosorbide mononitrate ER (IMDUR) 30 mg tablet TAKE 1 TABLET BY MOUTH ONCE DAILY 90 Tab 3    atorvastatin (LIPITOR) 40 mg tablet Take 1 Tab by mouth nightly. 30 Tab 6    hydrALAZINE (APRESOLINE) 50 mg tablet Take 1 Tab by mouth three (3) times daily. 90 Tab 6    aspirin 81 mg chewable tablet Take 1 Tab by mouth daily. 30 Tab 4    amLODIPine (NORVASC) 10 mg tablet Take 1 Tab by mouth daily. 30 Tab 4    furosemide (LASIX) 40 mg tablet Take 1 Tab by mouth two (2) times a day. 60 Tab 4    nitroglycerin (NITROSTAT) 0.4 mg SL tablet 1 Tab by SubLINGual route every five (5) minutes as needed for Chest Pain. Up to 3 doses. 20 Tab 0    docusate sodium (COLACE) 100 mg capsule Take 1 Cap by mouth two (2) times daily as needed for Constipation. 60 Cap 0       Social History   reports that he has quit smoking. He smoked 0.25 packs per day. He has never used smokeless tobacco.   reports that he does not drink alcohol. Family History  family history includes Heart Disease in his father. Review of Systems  Except as stated above include:  Constitutional: Negative for fever, chills and malaise/fatigue. HEENT: No congestion or recent URI. Gastrointestinal: No nausea, vomiting, abdominal pain, bloody stools. Pulmonary:  Negative except as stated above. Cardiac:  Negative except as stated above. Musculoskeletal: Negative except as stated above. Neurological:  No localized symptoms. Skin:  Negative except as stated above. Psych:  Negative except as stated above. Endocrine:  Negative except as stated above.     PHYSICAL EXAM  BP Readings from Last 3 Encounters:   06/13/19 (!) 180/122   02/01/19 130/76   01/23/19 143/89     Pulse Readings from Last 3 Encounters: 06/13/19 88   02/01/19 89   01/23/19 88     Wt Readings from Last 3 Encounters:   06/13/19 75.3 kg (166 lb)   02/01/19 65.8 kg (145 lb)   01/23/19 65.6 kg (144 lb 9.6 oz)     General:   Well developed, well groomed. Head/Neck:   No jugular venous distention     No carotid bruits. No evidence of xanthelasma. Lungs:   No respiratory distress. Clear bilaterally. Heart:    Regular rate and rhythm. Normal S1/S2. Palpation of heart with normal point of maximum impulse. No significant murmurs, rubs or gallops. Abdomen:   Soft and nontender. No palpable abdominal mass or bruits. Extremities:   Intact peripheral pulses. No significant edema. Neurological:   Alert and oriented to person, place, time. No focal neurological deficit visually. Skin:   No obvious rash    Blood Pressure Metric:  Monitor recommended and adjustments stated if needed.

## 2019-11-14 NOTE — PROGRESS NOTES
Mickeal Screen presents today for   Chief Complaint   Patient presents with    CHF     5 MONTH F/U - no cardiac complaints       [facility]al Screen preferred language for health care discussion is english/other. Is someone accompanying this pt? yes    Is the patient using any DME equipment during OV? yes    Depression Screening:  3 most recent PHQ Screens 6/13/2019   Little interest or pleasure in doing things Not at all   Feeling down, depressed, irritable, or hopeless Not at all   Total Score PHQ 2 0       Learning Assessment:  Learning Assessment 6/13/2019   PRIMARY LEARNER Patient   HIGHEST LEVEL OF EDUCATION - PRIMARY LEARNER  -   BARRIERS PRIMARY LEARNER -   CO-LEARNER CAREGIVER -   PRIMARY LANGUAGE ENGLISH   LEARNER PREFERENCE PRIMARY DEMONSTRATION   ANSWERED BY patient   RELATIONSHIP SELF       Abuse Screening:  Abuse Screening Questionnaire 6/13/2019   Do you ever feel afraid of your partner? N   Are you in a relationship with someone who physically or mentally threatens you? N   Is it safe for you to go home? Y       Fall Risk  No flowsheet data found. Pt currently taking Anticoagulant therapy? yes    Coordination of Care:  1. Have you been to the ER, urgent care clinic since your last visit? Hospitalized since your last visit? no    2. Have you seen or consulted any other health care providers outside of the [address] [place] [address] since your last visit? Include any pap smears or colon screening.  no

## 2020-04-27 RX ORDER — AMLODIPINE BESYLATE 10 MG/1
TABLET ORAL
Qty: 30 TAB | Refills: 0 | Status: SHIPPED | OUTPATIENT
Start: 2020-04-27 | End: 2020-06-22

## 2020-04-27 RX ORDER — HYDRALAZINE HYDROCHLORIDE 50 MG/1
TABLET, FILM COATED ORAL
Qty: 90 TAB | Refills: 0 | Status: SHIPPED | OUTPATIENT
Start: 2020-04-27 | End: 2020-05-31

## 2020-05-31 RX ORDER — HYDRALAZINE HYDROCHLORIDE 50 MG/1
TABLET, FILM COATED ORAL
Qty: 90 TAB | Refills: 0 | Status: SHIPPED | OUTPATIENT
Start: 2020-05-31 | End: 2020-08-10

## 2020-05-31 RX ORDER — CARVEDILOL 6.25 MG/1
TABLET ORAL
Qty: 60 TAB | Refills: 0 | Status: SHIPPED | OUTPATIENT
Start: 2020-05-31 | End: 2020-08-10

## 2020-06-22 RX ORDER — AMLODIPINE BESYLATE 10 MG/1
TABLET ORAL
Qty: 30 TAB | Refills: 0 | Status: SHIPPED | OUTPATIENT
Start: 2020-06-22 | End: 2020-08-10

## 2020-08-10 RX ORDER — ATORVASTATIN CALCIUM 40 MG/1
40 TABLET, FILM COATED ORAL
Qty: 90 TAB | Refills: 3 | Status: SHIPPED | OUTPATIENT
Start: 2020-08-10 | End: 2021-01-01 | Stop reason: SDUPTHER

## 2020-08-10 RX ORDER — HYDRALAZINE HYDROCHLORIDE 50 MG/1
TABLET, FILM COATED ORAL
Qty: 270 TAB | Refills: 3 | Status: SHIPPED | OUTPATIENT
Start: 2020-08-10 | End: 2021-01-01 | Stop reason: SDUPTHER

## 2020-08-10 RX ORDER — AMLODIPINE BESYLATE 10 MG/1
TABLET ORAL
Qty: 90 TAB | Refills: 3 | Status: SHIPPED | OUTPATIENT
Start: 2020-08-10 | End: 2021-01-01 | Stop reason: SDUPTHER

## 2020-08-10 RX ORDER — ISOSORBIDE MONONITRATE 30 MG/1
TABLET, EXTENDED RELEASE ORAL
Qty: 90 TAB | Refills: 3 | Status: SHIPPED | OUTPATIENT
Start: 2020-08-10 | End: 2021-01-01 | Stop reason: SDUPTHER

## 2020-08-10 RX ORDER — CLOPIDOGREL BISULFATE 75 MG/1
75 TABLET ORAL DAILY
Qty: 90 TAB | Refills: 3 | Status: SHIPPED | OUTPATIENT
Start: 2020-08-10 | End: 2021-01-01 | Stop reason: SDUPTHER

## 2020-08-10 RX ORDER — FUROSEMIDE 40 MG/1
40 TABLET ORAL 2 TIMES DAILY
Qty: 180 TAB | Refills: 3 | Status: SHIPPED | OUTPATIENT
Start: 2020-08-10 | End: 2021-01-01 | Stop reason: SDUPTHER

## 2020-08-10 RX ORDER — CARVEDILOL 6.25 MG/1
TABLET ORAL
Qty: 180 TAB | Refills: 3 | Status: SHIPPED | OUTPATIENT
Start: 2020-08-10 | End: 2020-09-03

## 2020-09-03 ENCOUNTER — OFFICE VISIT (OUTPATIENT)
Dept: CARDIOLOGY CLINIC | Age: 60
End: 2020-09-03

## 2020-09-03 VITALS
OXYGEN SATURATION: 99 % | HEIGHT: 68 IN | BODY MASS INDEX: 25.31 KG/M2 | HEART RATE: 73 BPM | DIASTOLIC BLOOD PRESSURE: 94 MMHG | WEIGHT: 167 LBS | SYSTOLIC BLOOD PRESSURE: 152 MMHG

## 2020-09-03 DIAGNOSIS — I42.9 CARDIOMYOPATHY, UNSPECIFIED TYPE (HCC): Primary | ICD-10-CM

## 2020-09-03 DIAGNOSIS — I50.22 CHRONIC SYSTOLIC CONGESTIVE HEART FAILURE (HCC): ICD-10-CM

## 2020-09-03 DIAGNOSIS — N28.89 RENAL MASS: ICD-10-CM

## 2020-09-03 DIAGNOSIS — R06.02 SOB (SHORTNESS OF BREATH): ICD-10-CM

## 2020-09-03 DIAGNOSIS — I10 ESSENTIAL HYPERTENSION: ICD-10-CM

## 2020-09-03 RX ORDER — CARVEDILOL 12.5 MG/1
12.5 TABLET ORAL 2 TIMES DAILY
Qty: 60 TAB | Refills: 6 | Status: SHIPPED | OUTPATIENT
Start: 2020-09-03 | End: 2021-01-01

## 2020-09-03 NOTE — PROGRESS NOTES
HPI: A 79-year-old man here for follow up. I last saw him November 2019. He was having issues with insurance and now he has Medicaid and is on disability. He was worried about his bills. He was admitted to the hospital January 2019 with new heart failure and cardiomyopathy, coronary artery disease with stenting. He also had a right renal mass. Initially, there was concern for carcinoma. Unfortunately, due to financial constraints and concern for insurance, he never saw urology. He was focusing on his heart. He tells me he has been doing quite well without any chest pain, dyspnea, palpitations, or syncope. We had talked about a Life Vest in the past as well. Impression/Plan: 1. History of mixed cardiomyopathy both ischemic and nonischemic with EF 20% January 2019, restrictive pattern by echocardiogram. He has not had a follow up echocardiogram and he has declined Life Vest due to financial reasons. 2. Chronic Class II systolic heart failure currently compensated. 3. Coronary artery disease with heart catheterization with PCI to RCA January 2019. He has a 70% left circ medically treated. 4. History of tobacco use, quit. 5. History of hypertension previously poorly controlled. 6. Right renal mass initially concerning for carcinoma by ultrasound January 2019, not yet followed with urology due to financial concerns. 7. Current Medicaid and disability recently granted. He is now less worried about financial issues and very stable from a cardiac standpoint. I am going to increase his Coreg today. I have made a direct referral to urology for this renal mass as it will affect what I can possibly offer him from a cardiac standpoint as he may be a candidate for AICD if this is not a malignancy. I am going to repeat an echocardiogram to evaluate his EF. All questions answered. I will see back in 3 months. Past Medical History:  
Diagnosis Date  Heart failure ([address]  Hypertension Current Outpatient Medications Medication Sig Dispense Refill  hydrALAZINE (APRESOLINE) 50 mg tablet TAKE 1 TABLET BY MOUTH THREE TIMES DAILY 270 Tab 3  carvediloL (COREG) 6.25 mg tablet TAKE 1 TABLET BY MOUTH TWICE DAILY WITH MEALS 180 Tab 3  
 amLODIPine (NORVASC) 10 mg tablet Take 1 tablet by mouth once daily 90 Tab 3  clopidogreL (Plavix) 75 mg tab Take 1 Tab by mouth daily. 90 Tab 3  
 isosorbide mononitrate ER (IMDUR) 30 mg tablet TAKE 1 TABLET BY MOUTH ONCE DAILY 90 Tab 3  
 atorvastatin (LIPITOR) 40 mg tablet Take 1 Tab by mouth nightly. 90 Tab 3  furosemide (Lasix) 40 mg tablet Take 1 Tab by mouth two (2) times a day. 180 Tab 3  
 nitroglycerin (NITROSTAT) 0.4 mg SL tablet 1 Tab by SubLINGual route every five (5) minutes as needed for Chest Pain. Up to 3 doses. 25 Tab 3  
 aspirin 81 mg chewable tablet Take 1 Tab by mouth daily. 30 Tab 4  
 docusate sodium (COLACE) 100 mg capsule Take 1 Cap by mouth two (2) times daily as needed for Constipation. 60 Cap 0 Social History 
 reports that he has quit smoking. He smoked 0.25 packs per day. He has never used smokeless tobacco. 
 reports no history of alcohol use. Family History 
family history includes Heart Disease in his father. Review of Systems Except as stated above include: 
Constitutional: Negative for fever, chills and malaise/fatigue. HEENT: No congestion or recent URI. Gastrointestinal: No nausea, vomiting, abdominal pain, bloody stools. Pulmonary:  Negative except as stated above. Cardiac:  Negative except as stated above. Musculoskeletal: Negative except as stated above. Neurological:  No localized symptoms. Skin:  Negative except as stated above. Psych:  Negative except as stated above. Endocrine:  Negative except as stated above. PHYSICAL EXAM 
BP Readings from Last 3 Encounters:  
09/03/20 (!) 152/94  
11/14/19 (!) 200/140  
06/13/19 (!) 180/122 Pulse Readings from Last 3 Encounters:  
09/03/20 73 11/14/19 77  
06/13/19 88 Wt Readings from Last 3 Encounters:  
09/03/20 75.8 kg (167 lb)  
11/14/19 80.7 kg (178 lb)  
06/13/19 75.3 kg (166 lb) General:   Well developed, well groomed. Head/Neck:   No jugular venous distention No carotid bruits. No evidence of xanthelasma. Lungs:   No respiratory distress. Clear bilaterally. Heart:    Regular rate and rhythm. Normal S1/S2. Palpation of heart with normal point of maximum impulse. No significant murmurs, rubs or gallops. Abdomen:   Soft and nontender. No palpable abdominal mass or bruits. Extremities:   Intact peripheral pulses. No significant edema. Neurological:   Alert and oriented to person, place, time. No focal neurological deficit visually. Skin:   No obvious rash Blood Pressure Metric: 
Monitor recommended and adjustments stated if needed.

## 2020-09-03 NOTE — PROGRESS NOTES
Mickeal Screen presents today for No chief complaint on file. Mickeal Screen preferred language for health care discussion is english/other. Is someone accompanying this pt? no 
 
Is the patient using any DME equipment during [address]? Yes cane Depression Screening: 
3 most recent PHQ Screens 6/13/2019 Little interest or pleasure in doing things Not at all Feeling down, depressed, irritable, or hopeless Not at all Total Score PHQ 2 0 Learning Assessment: 
Learning Assessment 6/13/2019 PRIMARY LEARNER Patient HIGHEST LEVEL OF EDUCATION - PRIMARY LEARNER  -  
BARRIERS PRIMARY LEARNER -  
CO-LEARNER CAREGIVER -  
PRIMARY LANGUAGE ENGLISH  
LEARNER PREFERENCE PRIMARY DEMONSTRATION  
ANSWERED BY patient RELATIONSHIP SELF Abuse Screening: 
Abuse Screening Questionnaire 6/13/2019 Do you ever feel afraid of your partner? Juan Jose Newberry Are you in a relationship with someone who physically or mentally threatens you? Juan Jose Newberry Is it safe for you to go home? Anthony Paulie Fall Risk No flowsheet data found. Pt currently taking Anticoagulant therapy? No  
 
Coordination of Care: 1. Have you been to the ER, urgent care clinic since your last visit? Hospitalized since your last visit? no 
 
2. Have you seen or consulted any other health care providers outside of the [address] [place] [address] since your last visit? Include any pap smears or colon screening.   no

## 2020-12-03 ENCOUNTER — OFFICE VISIT (OUTPATIENT)
Dept: CARDIOLOGY CLINIC | Age: 60
End: 2020-12-03
Payer: COMMERCIAL

## 2020-12-03 VITALS
SYSTOLIC BLOOD PRESSURE: 188 MMHG | HEART RATE: 68 BPM | WEIGHT: 178 LBS | DIASTOLIC BLOOD PRESSURE: 112 MMHG | OXYGEN SATURATION: 98 % | BODY MASS INDEX: 26.98 KG/M2 | HEIGHT: 68 IN

## 2020-12-03 DIAGNOSIS — I42.9 CARDIOMYOPATHY, UNSPECIFIED TYPE (HCC): Primary | ICD-10-CM

## 2020-12-03 DIAGNOSIS — R06.02 SOB (SHORTNESS OF BREATH): ICD-10-CM

## 2020-12-03 DIAGNOSIS — I42.9 CARDIOMYOPATHY, UNSPECIFIED TYPE (HCC): ICD-10-CM

## 2020-12-03 DIAGNOSIS — I10 ESSENTIAL HYPERTENSION: ICD-10-CM

## 2020-12-03 DIAGNOSIS — N28.89 RENAL MASS: ICD-10-CM

## 2020-12-03 DIAGNOSIS — I50.22 CHRONIC SYSTOLIC CONGESTIVE HEART FAILURE (HCC): ICD-10-CM

## 2020-12-03 PROCEDURE — 93000 ELECTROCARDIOGRAM COMPLETE: CPT | Performed by: INTERNAL MEDICINE

## 2020-12-03 PROCEDURE — 99214 OFFICE O/P EST MOD 30 MIN: CPT | Performed by: INTERNAL MEDICINE

## 2020-12-03 NOTE — PROGRESS NOTES
History of Present Illness:  64year old male here for follow up. I last saw him a few months ago. He was having some issuese with insurance and finally has his Medicaid card and is on disability. He was admitted to the hospital January, 2019 with new heart failure and cardiomyopathy, as well as coronary artery disease and stenting. He had a right renal mass, which was deferred. There was some initial concern for carcinoma. He has no new chest pain, dyspnea, PND, orthopnea, edema. No new urinary complaints. Impression:  1. History of mixed cardiomyopathy, both ischemic and nonischemic with EF 20% January, 2019, restrictive pattern by echocardiogram. He has not had any follow up studies. He declined LifeVest at the time due to financial reasons. 2. Chronic class 2 systolic heart failure, compensated. 3. History of CAD with PCI to RCA January, 2019. He had a 70% left circumflex, medically treated. 4. History of previous tobacco use. 5. Hypertension. 6. Right renal mass concerning for carcinoma by ultrasound January, 2019, not yet seen by urology due to financial concerns. 7. Recent initiation of Medicaid. Plan: At this point I am going to obtain an echocardiogram to reevaluate his LV function. He has been on medications. It would be reasonable to consider an AICD pending the results. His blood pressure is a bit high today despite increasing Coreg at last visit. I asked him to monitor his blood pressure. I am making a referral to urology to evaluate the renal mass before consideration for AICD as malignancy needs to be excluded. All questions answered. Past Medical History:   Diagnosis Date    Heart failure (HCC)     Hypertension        Current Outpatient Medications   Medication Sig Dispense Refill    carvediloL (COREG) 12.5 mg tablet Take 1 Tab by mouth two (2) times a day.  Indications: ventricular rate control in atrial fibrillation 60 Tab 6    hydrALAZINE (APRESOLINE) 50 mg tablet TAKE 1 TABLET BY MOUTH THREE TIMES DAILY 270 Tab 3    amLODIPine (NORVASC) 10 mg tablet Take 1 tablet by mouth once daily 90 Tab 3    clopidogreL (Plavix) 75 mg tab Take 1 Tab by mouth daily. 90 Tab 3    isosorbide mononitrate ER (IMDUR) 30 mg tablet TAKE 1 TABLET BY MOUTH ONCE DAILY 90 Tab 3    atorvastatin (LIPITOR) 40 mg tablet Take 1 Tab by mouth nightly. 90 Tab 3    furosemide (Lasix) 40 mg tablet Take 1 Tab by mouth two (2) times a day. 180 Tab 3    nitroglycerin (NITROSTAT) 0.4 mg SL tablet 1 Tab by SubLINGual route every five (5) minutes as needed for Chest Pain. Up to 3 doses. 25 Tab 3    aspirin 81 mg chewable tablet Take 1 Tab by mouth daily. 30 Tab 4       Social History   reports that he has quit smoking. He smoked 0.25 packs per day. He has never used smokeless tobacco.   reports no history of alcohol use. Family History  family history includes Heart Disease in his father. Review of Systems  Except as stated above include:  Constitutional: Negative for fever, chills and malaise/fatigue. HEENT: No congestion or recent URI. Gastrointestinal: No nausea, vomiting, abdominal pain, bloody stools. Pulmonary:  Negative except as stated above. Cardiac:  Negative except as stated above. Musculoskeletal: Negative except as stated above. Neurological:  No localized symptoms. Skin:  Negative except as stated above. Psych:  Negative except as stated above. Endocrine:  Negative except as stated above. PHYSICAL EXAM  BP Readings from Last 3 Encounters:   12/03/20 (!) 188/112   09/03/20 (!) 152/94   11/14/19 (!) 200/140     Pulse Readings from Last 3 Encounters:   12/03/20 68   09/03/20 73   11/14/19 77     Wt Readings from Last 3 Encounters:   12/03/20 80.7 kg (178 lb)   09/03/20 75.8 kg (167 lb)   11/14/19 80.7 kg (178 lb)     General:   Well developed, well groomed. Head/Neck:   No obvious jugular venous distention     No obvious carotid pulsations. No evidence of xanthelasma.   Lungs: No respiratory distress. Clear bilaterally. Heart:  Regular rate and rhythm. Normal S1/S2. Palpation grossly normal.    No significant murmurs, rubs or gallops. Abdomen:   Non-acute abdomen. No obvious pulsations. Extremities:   Intact peripheral pulses. No significant edema. Neurological:   Alert and oriented to person, place, time. No focal neurological deficit visually. Skin:   No obvious rash    Blood Pressure Metric:  Monitor recommended and adjustments stated if needed.

## 2020-12-03 NOTE — PROGRESS NOTES
Cricket July presents today for   Chief Complaint   Patient presents with    Follow-up     3 month follow up       Cricket July preferred language for health care discussion is english/other. Is someone accompanying this pt? yes    Is the patient using any DME equipment during OV? cane    Depression Screening:  3 most recent PHQ Screens 12/3/2020   Little interest or pleasure in doing things Not at all   Feeling down, depressed, irritable, or hopeless Not at all   Total Score PHQ 2 0       Learning Assessment:  Learning Assessment 12/3/2020   PRIMARY LEARNER Patient   HIGHEST LEVEL OF EDUCATION - PRIMARY LEARNER  -   BARRIERS PRIMARY LEARNER -   CO-LEARNER CAREGIVER -   PRIMARY LANGUAGE ENGLISH   LEARNER PREFERENCE PRIMARY DEMONSTRATION   ANSWERED BY patient   RELATIONSHIP SELF       Abuse Screening:  Abuse Screening Questionnaire 12/3/2020   Do you ever feel afraid of your partner? N   Are you in a relationship with someone who physically or mentally threatens you? N   Is it safe for you to go home? Y       Fall Risk  Fall Risk Assessment, last 12 mths 12/3/2020   Able to walk? Yes   Fall in past 12 months? No       Pt currently taking Anticoagulant therapy? no    Coordination of Care:  1. Have you been to the ER, urgent care clinic since your last visit? Hospitalized since your last visit? no    2. Have you seen or consulted any other health care providers outside of the [address] [place] [address] since your last visit? Include any pap smears or colon screening.  no

## 2020-12-09 ENCOUNTER — TELEPHONE (OUTPATIENT)
Dept: CARDIOLOGY CLINIC | Age: 60
End: 2020-12-09

## 2020-12-09 NOTE — TELEPHONE ENCOUNTER
Called and unable to reach patient to confirm echocardiogram appointment for tomorrow.     Pedro Aldana, RCS, RDCS

## 2021-01-01 ENCOUNTER — OFFICE VISIT (OUTPATIENT)
Dept: CARDIOLOGY CLINIC | Age: 61
End: 2021-01-01
Payer: COMMERCIAL

## 2021-01-01 ENCOUNTER — TELEPHONE (OUTPATIENT)
Dept: CARDIOLOGY CLINIC | Age: 61
End: 2021-01-01

## 2021-01-01 VITALS
DIASTOLIC BLOOD PRESSURE: 90 MMHG | BODY MASS INDEX: 24.25 KG/M2 | HEART RATE: 66 BPM | OXYGEN SATURATION: 98 % | SYSTOLIC BLOOD PRESSURE: 170 MMHG | HEIGHT: 68 IN | WEIGHT: 160 LBS

## 2021-01-01 DIAGNOSIS — N28.89 RENAL MASS: ICD-10-CM

## 2021-01-01 DIAGNOSIS — I50.22 CHRONIC SYSTOLIC CONGESTIVE HEART FAILURE (HCC): ICD-10-CM

## 2021-01-01 DIAGNOSIS — I42.9 CARDIOMYOPATHY, UNSPECIFIED TYPE (HCC): Primary | ICD-10-CM

## 2021-01-01 DIAGNOSIS — R06.02 SOB (SHORTNESS OF BREATH): ICD-10-CM

## 2021-01-01 DIAGNOSIS — I10 ESSENTIAL HYPERTENSION: ICD-10-CM

## 2021-01-01 PROCEDURE — 99214 OFFICE O/P EST MOD 30 MIN: CPT | Performed by: INTERNAL MEDICINE

## 2021-01-01 PROCEDURE — 93000 ELECTROCARDIOGRAM COMPLETE: CPT | Performed by: INTERNAL MEDICINE

## 2021-01-01 RX ORDER — CLOPIDOGREL BISULFATE 75 MG/1
75 TABLET ORAL DAILY
Qty: 90 TABLET | Refills: 3 | Status: SHIPPED | OUTPATIENT
Start: 2021-01-01 | End: 2022-01-01

## 2021-01-01 RX ORDER — ISOSORBIDE MONONITRATE 30 MG/1
TABLET, EXTENDED RELEASE ORAL
Qty: 90 TABLET | Refills: 3 | Status: SHIPPED | OUTPATIENT
Start: 2021-01-01 | End: 2022-01-01

## 2021-01-01 RX ORDER — ATORVASTATIN CALCIUM 40 MG/1
40 TABLET, FILM COATED ORAL
Qty: 90 TABLET | Refills: 3 | Status: SHIPPED | OUTPATIENT
Start: 2021-01-01 | End: 2022-01-01

## 2021-01-01 RX ORDER — FUROSEMIDE 40 MG/1
40 TABLET ORAL 2 TIMES DAILY
Qty: 180 TABLET | Refills: 3 | Status: SHIPPED | OUTPATIENT
Start: 2021-01-01 | End: 2021-01-01 | Stop reason: SDUPTHER

## 2021-01-01 RX ORDER — FUROSEMIDE 40 MG/1
40 TABLET ORAL 2 TIMES DAILY
Qty: 180 TABLET | Refills: 3 | Status: SHIPPED | OUTPATIENT
Start: 2021-01-01 | End: 2022-01-01

## 2021-01-01 RX ORDER — HYDRALAZINE HYDROCHLORIDE 50 MG/1
50 TABLET, FILM COATED ORAL 3 TIMES DAILY
Qty: 270 TABLET | Refills: 3 | Status: SHIPPED | OUTPATIENT
Start: 2021-01-01 | End: 2022-01-01

## 2021-01-01 RX ORDER — CARVEDILOL 12.5 MG/1
TABLET ORAL
Qty: 60 TABLET | Refills: 6 | Status: SHIPPED | OUTPATIENT
Start: 2021-01-01 | End: 2022-01-01

## 2021-01-01 RX ORDER — AMLODIPINE BESYLATE 10 MG/1
TABLET ORAL
Qty: 90 TABLET | Refills: 3 | Status: SHIPPED | OUTPATIENT
Start: 2021-01-01 | End: 2022-01-01

## 2021-06-24 NOTE — PROGRESS NOTES
History of Present Illness:  63year old male here for follow up. Last time I saw him in December, we were trying to obtain an echocardiogram. He was in the process of getting the Medicaid card. He initially was admitted to the hospital January, 2019 with new cardiomyopathy and heart failure, as well as coronary artery disease and stenting. He also had a right renal mass, which was deferred workup. There was concern for carcinoma. I placed a referral to urology as well, but he did not follow up. Today he has not had any chest pain, dyspnea, PND, orthopnea or edema, but did run out of his Amlodipine and his blood pressure is increased. Impression:  1. History of mixed cardiomyopathy with both ischemic and nonischemic component with EF 20% January, 2019, restrictive pattern by echocardiogram.  Has not had follow up studies, declined LifeVest at the time due to financial reasons. 2. History of class 2 systolic heart failure, compensated. 3. History of CAD with PCI to RCA January, 2019. He had a 70% left circumflex, medically treated. 4. Hypertension, increased today as he has run out of Amlodipine. 5. Right renal mass, concerning for carcinoma by ultrasound January, 2019. Not yet seen by urology for follow up. 6. Initiation of Medicaid earlier this year. Plan:  I encouraged him to let us know if he runs out of medications again, and I am going to refill his Amlodipine. I am going to obtain a follow up echocardiogram to evaluate LV function and place another consult to urology, as there was concern about possible underlying carcinoma with the right renal mass. I will see back after testing.       Past Medical History:   Diagnosis Date    Heart failure (HCC)     Hypertension        Current Outpatient Medications   Medication Sig Dispense Refill    amLODIPine (NORVASC) 10 mg tablet Take 1 tablet by mouth once daily 90 Tablet 3    carvediloL (COREG) 12.5 mg tablet Take 1 Tab by mouth two (2) times a day. Indications: ventricular rate control in atrial fibrillation 60 Tab 6    hydrALAZINE (APRESOLINE) 50 mg tablet TAKE 1 TABLET BY MOUTH THREE TIMES DAILY 270 Tab 3    clopidogreL (Plavix) 75 mg tab Take 1 Tab by mouth daily. 90 Tab 3    isosorbide mononitrate ER (IMDUR) 30 mg tablet TAKE 1 TABLET BY MOUTH ONCE DAILY 90 Tab 3    atorvastatin (LIPITOR) 40 mg tablet Take 1 Tab by mouth nightly. 90 Tab 3    furosemide (Lasix) 40 mg tablet Take 1 Tab by mouth two (2) times a day. 180 Tab 3    nitroglycerin (NITROSTAT) 0.4 mg SL tablet 1 Tab by SubLINGual route every five (5) minutes as needed for Chest Pain. Up to 3 doses. 25 Tab 3    aspirin 81 mg chewable tablet Take 1 Tab by mouth daily. 30 Tab 4       Social History   reports that he has quit smoking. He smoked 0.25 packs per day. He has never used smokeless tobacco.   reports no history of alcohol use. Family History  family history includes Heart Disease in his father. Review of Systems  Except as stated above include:  Constitutional: Negative for fever, chills and malaise/fatigue. HEENT: No congestion or recent URI. Gastrointestinal: No nausea, vomiting, abdominal pain, bloody stools. Pulmonary:  Negative except as stated above. Cardiac:  Negative except as stated above. Musculoskeletal: Negative except as stated above. Neurological:  No localized symptoms. Skin:  Negative except as stated above. Psych:  Negative except as stated above. Endocrine:  Negative except as stated above. PHYSICAL EXAM  BP Readings from Last 3 Encounters:   06/24/21 (!) 170/90   12/03/20 (!) 188/112   09/03/20 (!) 152/94     Pulse Readings from Last 3 Encounters:   06/24/21 66   12/03/20 68   09/03/20 73     Wt Readings from Last 3 Encounters:   06/24/21 72.6 kg (160 lb)   12/03/20 80.7 kg (178 lb)   09/03/20 75.8 kg (167 lb)     General:   Well developed, well groomed. Head/Neck:   No obvious jugular venous distention     No obvious carotid pulsations. No evidence of xanthelasma. Lungs:   No respiratory distress. Clear bilaterally. Heart:  Regular rate and rhythm. Normal S1/S2. Palpation grossly normal.    No significant murmurs, rubs or gallops. Abdomen:   Non-acute abdomen. No obvious pulsations. Extremities:   Intact peripheral pulses. No significant edema. Neurological:   Alert and oriented to person, place, time. No focal neurological deficit visually. Skin:   No obvious rash    Blood Pressure Metric:  Monitor recommended and adjustments stated if needed.

## 2021-06-24 NOTE — PROGRESS NOTES
Malissa Le presents today for   Chief Complaint   Patient presents with    Follow-up     3-4 month follow up - no cardaic complaints        Malissa Le preferred language for health care discussion is english/other. Is someone accompanying this pt? no    Is the patient using any DME equipment during [address]? no    Depression Screening:  3 most recent PHQ Screens 6/24/2021   Little interest or pleasure in doing things Not at all   Feeling down, depressed, irritable, or hopeless Not at all   Total Score PHQ 2 0       Learning Assessment:  Learning Assessment 12/3/2020   PRIMARY LEARNER Patient   HIGHEST LEVEL OF EDUCATION - PRIMARY LEARNER  -   BARRIERS PRIMARY LEARNER -   CO-LEARNER CAREGIVER -   PRIMARY LANGUAGE ENGLISH   LEARNER PREFERENCE PRIMARY DEMONSTRATION   ANSWERED BY patient   RELATIONSHIP SELF       Abuse Screening:  Abuse Screening Questionnaire 12/3/2020   Do you ever feel afraid of your partner? N   Are you in a relationship with someone who physically or mentally threatens you? N   Is it safe for you to go home? Y       Fall Risk  Fall Risk Assessment, last 12 mths 12/3/2020   Able to walk? Yes   Fall in past 12 months? No       Pt currently taking Anticoagulant therapy? ASA 81mg every day     Coordination of Care:  1. Have you been to the ER, urgent care clinic since your last visit? Hospitalized since your last visit? no    2. Have you seen or consulted any other health care providers outside of the [address] [place] [address] since your last visit? Include any pap smears or colon screening.  no

## 2021-07-27 NOTE — TELEPHONE ENCOUNTER
Spoke with patient, name and  verified, results given, patient thankful for the good news, stressed the importance of continuing all medications.

## 2021-07-27 NOTE — TELEPHONE ENCOUNTER
----- Message from Berkley Camp MD sent at 7/26/2021  4:00 PM EDT -----  Please let him know his heart function has improved to 45%, it was 20%.   This is good news but he NEEDS to stay on all his medications.  ----- Message -----  From: Whit Meléndez MD  Sent: 7/26/2021   3:27 PM EDT  To: Berkley Camp MD

## 2022-01-01 ENCOUNTER — APPOINTMENT (OUTPATIENT)
Dept: MRI IMAGING | Age: 62
End: 2022-01-01
Attending: PHYSICIAN ASSISTANT
Payer: MEDICAID

## 2022-01-01 ENCOUNTER — HOSPICE ADMISSION (OUTPATIENT)
Dept: HOSPICE | Facility: HOSPICE | Age: 62
End: 2022-01-01

## 2022-01-01 ENCOUNTER — APPOINTMENT (OUTPATIENT)
Dept: GENERAL RADIOLOGY | Age: 62
End: 2022-01-01
Attending: PHYSICIAN ASSISTANT
Payer: MEDICAID

## 2022-01-01 ENCOUNTER — APPOINTMENT (OUTPATIENT)
Dept: CT IMAGING | Age: 62
End: 2022-01-01
Attending: PHYSICIAN ASSISTANT
Payer: MEDICAID

## 2022-01-01 ENCOUNTER — HOSPITAL ENCOUNTER (EMERGENCY)
Age: 62
Discharge: HOME | End: 2022-01-25
Attending: STUDENT IN AN ORGANIZED HEALTH CARE EDUCATION/TRAINING PROGRAM
Payer: MEDICAID

## 2022-01-01 VITALS
SYSTOLIC BLOOD PRESSURE: 125 MMHG | DIASTOLIC BLOOD PRESSURE: 78 MMHG | RESPIRATION RATE: 20 BRPM | WEIGHT: 150 LBS | BODY MASS INDEX: 23.54 KG/M2 | TEMPERATURE: 97.7 F | OXYGEN SATURATION: 100 % | HEIGHT: 67 IN | HEART RATE: 102 BPM

## 2022-01-01 DIAGNOSIS — R33.9 URINARY RETENTION: Primary | ICD-10-CM

## 2022-01-01 DIAGNOSIS — R29.898 BILATERAL LEG WEAKNESS: ICD-10-CM

## 2022-01-01 LAB
ALBUMIN SERPL-MCNC: 2.6 G/DL (ref 3.4–5)
ALBUMIN/GLOB SERPL: 0.5 {RATIO} (ref 0.8–1.7)
ALP SERPL-CCNC: 95 U/L (ref 45–117)
ALT SERPL-CCNC: 20 U/L (ref 16–61)
AMPHET UR QL SCN: NEGATIVE
ANION GAP SERPL CALC-SCNC: 6 MMOL/L (ref 3–18)
APPEARANCE UR: CLEAR
AST SERPL-CCNC: 39 U/L (ref 10–38)
BACTERIA SPEC CULT: NORMAL
BACTERIA URNS QL MICRO: NEGATIVE /HPF
BARBITURATES UR QL SCN: NEGATIVE
BASOPHILS # BLD: 0 K/UL (ref 0–0.1)
BASOPHILS NFR BLD: 0 % (ref 0–2)
BENZODIAZ UR QL: NEGATIVE
BILIRUB SERPL-MCNC: 0.7 MG/DL (ref 0.2–1)
BILIRUB UR QL: NEGATIVE
BUN SERPL-MCNC: 16 MG/DL (ref 7–18)
BUN/CREAT SERPL: 12 (ref 12–20)
CALCIUM SERPL-MCNC: 8.9 MG/DL (ref 8.5–10.1)
CANNABINOIDS UR QL SCN: POSITIVE
CHLORIDE SERPL-SCNC: 104 MMOL/L (ref 100–111)
CK SERPL-CCNC: 76 U/L (ref 39–308)
CO2 SERPL-SCNC: 28 MMOL/L (ref 21–32)
COCAINE UR QL SCN: NEGATIVE
COLOR UR: YELLOW
COVID-19 RAPID TEST, COVR: NOT DETECTED
CREAT SERPL-MCNC: 1.31 MG/DL (ref 0.6–1.3)
CRP SERPL-MCNC: 13.1 MG/DL (ref 0–0.3)
DIFFERENTIAL METHOD BLD: ABNORMAL
EOSINOPHIL # BLD: 2.3 K/UL (ref 0–0.4)
EOSINOPHIL NFR BLD: 13 % (ref 0–5)
EPITH CASTS URNS QL MICRO: NORMAL /LPF (ref 0–5)
ERYTHROCYTE [DISTWIDTH] IN BLOOD BY AUTOMATED COUNT: 20.5 % (ref 11.6–14.5)
ERYTHROCYTE [SEDIMENTATION RATE] IN BLOOD: 67 MM/HR (ref 0–20)
GLOBULIN SER CALC-MCNC: 5 G/DL (ref 2–4)
GLUCOSE SERPL-MCNC: 85 MG/DL (ref 74–99)
GLUCOSE UR STRIP.AUTO-MCNC: NEGATIVE MG/DL
HCT VFR BLD AUTO: 26.9 % (ref 36–48)
HDSCOM,HDSCOM: ABNORMAL
HGB BLD-MCNC: 8.8 G/DL (ref 13–16)
HGB UR QL STRIP: NEGATIVE
IMM GRANULOCYTES # BLD AUTO: 0 K/UL
IMM GRANULOCYTES NFR BLD AUTO: 0 %
KETONES UR QL STRIP.AUTO: NEGATIVE MG/DL
LACTATE BLD-SCNC: 1.51 MMOL/L (ref 0.4–2)
LEUKOCYTE ESTERASE UR QL STRIP.AUTO: ABNORMAL
LYMPHOCYTES # BLD: 3.6 K/UL (ref 0.9–3.6)
LYMPHOCYTES NFR BLD: 20 % (ref 21–52)
MCH RBC QN AUTO: 23.5 PG (ref 24–34)
MCHC RBC AUTO-ENTMCNC: 32.7 G/DL (ref 31–37)
MCV RBC AUTO: 71.9 FL (ref 78–100)
METHADONE UR QL: NEGATIVE
MONOCYTES # BLD: 0.2 K/UL (ref 0.05–1.2)
MONOCYTES NFR BLD: 1 % (ref 3–10)
NEUTS SEG # BLD: 11.7 K/UL (ref 1.8–8)
NEUTS SEG NFR BLD: 66 % (ref 40–73)
NITRITE UR QL STRIP.AUTO: NEGATIVE
NRBC # BLD: 0 K/UL (ref 0–0.01)
NRBC BLD-RTO: 0 PER 100 WBC
OPIATES UR QL: NEGATIVE
PCP UR QL: NEGATIVE
PH UR STRIP: 8.5 [PH] (ref 5–8)
PLATELET # BLD AUTO: 370 K/UL (ref 135–420)
PLATELET COMMENTS,PCOM: ABNORMAL
PMV BLD AUTO: 9.8 FL (ref 9.2–11.8)
POTASSIUM SERPL-SCNC: 4.6 MMOL/L (ref 3.5–5.5)
PROT SERPL-MCNC: 7.6 G/DL (ref 6.4–8.2)
PROT UR STRIP-MCNC: 30 MG/DL
RBC # BLD AUTO: 3.74 M/UL (ref 4.35–5.65)
RBC #/AREA URNS HPF: NORMAL /HPF (ref 0–5)
RBC MORPH BLD: ABNORMAL
SERVICE CMNT-IMP: NORMAL
SODIUM SERPL-SCNC: 138 MMOL/L (ref 136–145)
SOURCE, COVRS: NORMAL
SP GR UR REFRACTOMETRY: 1.02 (ref 1–1.03)
UROBILINOGEN UR QL STRIP.AUTO: 1 EU/DL (ref 0.2–1)
WBC # BLD AUTO: 17.8 K/UL (ref 4.6–13.2)
WBC URNS QL MICRO: NORMAL /HPF (ref 0–5)

## 2022-01-01 PROCEDURE — 85025 COMPLETE CBC W/AUTO DIFF WBC: CPT

## 2022-01-01 PROCEDURE — 72156 MRI NECK SPINE W/O & W/DYE: CPT

## 2022-01-01 PROCEDURE — 83605 ASSAY OF LACTIC ACID: CPT

## 2022-01-01 PROCEDURE — 81001 URINALYSIS AUTO W/SCOPE: CPT

## 2022-01-01 PROCEDURE — 99284 EMERGENCY DEPT VISIT MOD MDM: CPT

## 2022-01-01 PROCEDURE — 99221 1ST HOSP IP/OBS SF/LOW 40: CPT | Performed by: NURSE PRACTITIONER

## 2022-01-01 PROCEDURE — 96365 THER/PROPH/DIAG IV INF INIT: CPT

## 2022-01-01 PROCEDURE — 87086 URINE CULTURE/COLONY COUNT: CPT

## 2022-01-01 PROCEDURE — 96366 THER/PROPH/DIAG IV INF ADDON: CPT

## 2022-01-01 PROCEDURE — 96376 TX/PRO/DX INJ SAME DRUG ADON: CPT

## 2022-01-01 PROCEDURE — A9577 INJ MULTIHANCE: HCPCS | Performed by: STUDENT IN AN ORGANIZED HEALTH CARE EDUCATION/TRAINING PROGRAM

## 2022-01-01 PROCEDURE — 76450000000

## 2022-01-01 PROCEDURE — 87040 BLOOD CULTURE FOR BACTERIA: CPT

## 2022-01-01 PROCEDURE — 70551 MRI BRAIN STEM W/O DYE: CPT

## 2022-01-01 PROCEDURE — 71045 X-RAY EXAM CHEST 1 VIEW: CPT

## 2022-01-01 PROCEDURE — 85652 RBC SED RATE AUTOMATED: CPT

## 2022-01-01 PROCEDURE — 74177 CT ABD & PELVIS W/CONTRAST: CPT

## 2022-01-01 PROCEDURE — 86140 C-REACTIVE PROTEIN: CPT

## 2022-01-01 PROCEDURE — 82550 ASSAY OF CK (CPK): CPT

## 2022-01-01 PROCEDURE — 87635 SARS-COV-2 COVID-19 AMP PRB: CPT

## 2022-01-01 PROCEDURE — 96375 TX/PRO/DX INJ NEW DRUG ADDON: CPT

## 2022-01-01 PROCEDURE — 80053 COMPREHEN METABOLIC PANEL: CPT

## 2022-01-01 PROCEDURE — 96367 TX/PROPH/DG ADDL SEQ IV INF: CPT

## 2022-01-01 PROCEDURE — 72158 MRI LUMBAR SPINE W/O & W/DYE: CPT

## 2022-01-01 PROCEDURE — 74011250636 HC RX REV CODE- 250/636: Performed by: STUDENT IN AN ORGANIZED HEALTH CARE EDUCATION/TRAINING PROGRAM

## 2022-01-01 PROCEDURE — 80307 DRUG TEST PRSMV CHEM ANLYZR: CPT

## 2022-01-01 PROCEDURE — 72157 MRI CHEST SPINE W/O & W/DYE: CPT

## 2022-01-01 PROCEDURE — 74011000258 HC RX REV CODE- 258: Performed by: PHYSICIAN ASSISTANT

## 2022-01-01 PROCEDURE — 74011000636 HC RX REV CODE- 636: Performed by: STUDENT IN AN ORGANIZED HEALTH CARE EDUCATION/TRAINING PROGRAM

## 2022-01-01 PROCEDURE — 74011250636 HC RX REV CODE- 250/636: Performed by: PHYSICIAN ASSISTANT

## 2022-01-01 PROCEDURE — 74011250636 HC RX REV CODE- 250/636: Performed by: EMERGENCY MEDICINE

## 2022-01-01 PROCEDURE — 96368 THER/DIAG CONCURRENT INF: CPT

## 2022-01-01 PROCEDURE — A9576 INJ PROHANCE MULTIPACK: HCPCS | Performed by: STUDENT IN AN ORGANIZED HEALTH CARE EDUCATION/TRAINING PROGRAM

## 2022-01-01 RX ORDER — MORPHINE SULFATE 4 MG/ML
4 INJECTION INTRAVENOUS
Status: COMPLETED | OUTPATIENT
Start: 2022-01-01 | End: 2022-01-01

## 2022-01-01 RX ORDER — DEXAMETHASONE SODIUM PHOSPHATE 4 MG/ML
10 INJECTION, SOLUTION INTRA-ARTICULAR; INTRALESIONAL; INTRAMUSCULAR; INTRAVENOUS; SOFT TISSUE ONCE
Status: COMPLETED | OUTPATIENT
Start: 2022-01-01 | End: 2022-01-01

## 2022-01-01 RX ORDER — MORPHINE SULFATE 4 MG/ML
4 INJECTION INTRAVENOUS
Status: DISCONTINUED | OUTPATIENT
Start: 2022-01-01 | End: 2022-01-01

## 2022-01-01 RX ORDER — VANCOMYCIN/0.9 % SOD CHLORIDE 1.5G/250ML
1500 PLASTIC BAG, INJECTION (ML) INTRAVENOUS ONCE
Status: COMPLETED | OUTPATIENT
Start: 2022-01-01 | End: 2022-01-01

## 2022-01-01 RX ORDER — SODIUM CHLORIDE 0.9 % (FLUSH) 0.9 %
5-10 SYRINGE (ML) INJECTION AS NEEDED
Status: DISCONTINUED | OUTPATIENT
Start: 2022-01-01 | End: 2022-01-01 | Stop reason: HOSPADM

## 2022-01-01 RX ORDER — LEVOFLOXACIN 5 MG/ML
750 INJECTION, SOLUTION INTRAVENOUS EVERY 24 HOURS
Status: DISCONTINUED | OUTPATIENT
Start: 2022-01-01 | End: 2022-01-01

## 2022-01-01 RX ADMIN — SODIUM CHLORIDE 2040 ML: 900 INJECTION, SOLUTION INTRAVENOUS at 12:07

## 2022-01-01 RX ADMIN — PIPERACILLIN AND TAZOBACTAM 4.5 G: 4; .5 INJECTION, POWDER, FOR SOLUTION INTRAVENOUS at 12:07

## 2022-01-01 RX ADMIN — VANCOMYCIN HYDROCHLORIDE 750 MG: 750 INJECTION, POWDER, LYOPHILIZED, FOR SOLUTION INTRAVENOUS at 22:43

## 2022-01-01 RX ADMIN — PIPERACILLIN AND TAZOBACTAM 4.5 G: 4; .5 INJECTION, POWDER, FOR SOLUTION INTRAVENOUS at 17:57

## 2022-01-01 RX ADMIN — IOPAMIDOL 95 ML: 612 INJECTION, SOLUTION INTRAVENOUS at 11:45

## 2022-01-01 RX ADMIN — VANCOMYCIN HYDROCHLORIDE 1500 MG: 10 INJECTION, POWDER, LYOPHILIZED, FOR SOLUTION INTRAVENOUS at 12:07

## 2022-01-01 RX ADMIN — DEXAMETHASONE SODIUM PHOSPHATE 10 MG: 4 INJECTION, SOLUTION INTRAMUSCULAR; INTRAVENOUS at 22:42

## 2022-01-01 RX ADMIN — LEVOFLOXACIN 750 MG: 750 INJECTION, SOLUTION INTRAVENOUS at 12:07

## 2022-01-01 RX ADMIN — MORPHINE SULFATE 4 MG: 4 INJECTION, SOLUTION INTRAMUSCULAR; INTRAVENOUS at 19:10

## 2022-01-01 RX ADMIN — GADOTERIDOL 10 ML: 279.3 INJECTION, SOLUTION INTRAVENOUS at 20:06

## 2022-01-01 RX ADMIN — GADOBENATE DIMEGLUMINE 10 ML: 529 INJECTION, SOLUTION INTRAVENOUS at 11:20

## 2022-01-01 RX ADMIN — MORPHINE SULFATE 4 MG: 4 INJECTION INTRAVENOUS at 13:59
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
## 2022-01-23 NOTE — PROGRESS NOTES
MRI Safety Screening form needs to be filled out and FAXED to [phone] BEFORE MRI can be scheduled. If unable to acquire information from patient, [facility] must be contacted, or screening xrays will need to be ordred.     If pt is Claustrophobic or needs Pain Meds, please have these ordered in advance to help facilitate MRI exam.

## 2022-01-23 NOTE — ED TRIAGE NOTES
Abdominal pain and distention x 1 week, SOB and leg weakness x 3 days. Last BM 3 days ago. Pt reports minimal urine output this week as well.

## 2022-01-24 NOTE — PROGRESS NOTES
CM Chart Review:    Patient's family not on board with hospice. [facility] Palliative consulted for goals of care for patient. CM will continue to monitor for discharge planning needs. Davi Thomas updated via [employer].              Ev Louis RN  Case Management [phone]

## 2022-01-24 NOTE — ED NOTES
1:43 AM  I spoke with Becki and neurosurgeon Dr. Lul Schrader. Patient has 2+ metastatic brain lesions which would make it not operable. Patient has multiple spine mets as well. Dr. Forrest Renteria was consulted after all MRI imaging was obtained; appropriate treatment would be steroids, radiation and hospice. Patient is comfortable and prefers becoming hospice. Spoke with our nursing supervisor and hospitalist to confirm that patient does not need admission inpatient placement prior to becoming hospice. Have placed case management consultation. Will have patient evaluated by case management in the morning for hospice placement.  - anastasia, joan

## 2022-01-24 NOTE — PALLIATIVE CARE
[address] [phone]    Jhony Willett, LAUREL and this LMSW met with pt at bedside to assess. Pt alert and oriented. Pt states he came to hospital for gastritis, but is aware he has a spot on his kidney that has gotten bigger, and that it's spread to his back/spine and brain. He reports he does not want hospice at this time. He reports he is going to go for a second opinion. He reports he will discuss treatment options with family. He reports he wants to go home, but will need a wheelchair, due to can no longer walk, due to spine mets. He reports a few family members are in the medical field, in various professions, and family is willing and able to help care for him in the home. Pt agreeable to home care, and gave palliative team permission to contact his wife, Ester Coates ([phone]) via telephone to inform her of plans. LMSW contacted Case Management main number to inform them of pt's needs, as he will be DC'd  home from ED. The person who answered requested orders for Home Palliative and DME to be put in. NP put in orders. LMSW made telephone contact with pt's spouse at aforementioned number. Kush Glover reports she is aware of pt's condition and that they are planning to follow up with an Oncologist outpatient. Pt's spouse reports he does need a wheelchair, due to inability to walk. LMSW informed her orders are being put in.  Pt's spouse reports his son will be coming to hospital soon, to transport. Thank you for this referral to Palliative Care. The palliative care team has addressed palliative related issues. We will sign off at this time. CODE STATUS:  FULL CODE/FULL AGGRESSIVE MEASURES.      Michael Sousa, [facility]  Palliative Medicine Inpatient   [facility]  Palliative COPE Line: [phone] ([phone])

## 2022-01-24 NOTE — ED NOTES
Pt called nurse to his room and requested that MD be notified that he would prefer hospice at home with his family and does not want any more treatment in hospital.   DEBBY Brambila Economy notified at this time.   Will be set up to see case management in a.m

## 2022-01-24 NOTE — CONSULTS
[id number] [facility]: [phone]  [facility]: [address] [place] [address] Way: [phone]    Patient Name: Ricki Benjamin  YOB: 1960    Date of Initial Consult: 1/24/2022  Reason for Consult: goals of care   Requesting Provider: Dr Richard Booth   Primary Care Physician: None      SUMMARY:   Ricki Benjamin is a 63y.o. year old with a past history of htn, CHF, renal mass , who was admitted on 1/23/2022 from home with a diagnosis of urinary retention, weakness to lower legs bilaterally with sharp low back back pain. Current medical issues leading to Palliative Medicine involvement include: 63year old year old male with renal mass and lower extremity weakness. Palliative medicine is consulted for care decisions. PALLIATIVE DIAGNOSES:   1. Goals of care   2. Renal mass   3. Lower leg weakness   4. Debility        PLAN:   1. Goals of  Care Mr Jose Gupta seen in ER along with Ms Wen Abdirizak, :LMSW. He is alert and oriented x 3. Understands he has a renal mass and it has grown. Now has brain mets. He would like to speak with his family to discuss treatment. At this time he does not want hospice services. We also spoke with his wife who understands he wishes to come home today. She also affirmed there are family members to help him. Would recommend home with home health Palliative home care would be a good option. I placed order, will also need w/c for mobility. I also ordered. Goals of care full code with full interventions. 2. Renal mass patient told us he knew about lesion a while ago but wanted to \" concentrate on his heart function\". He is also aware the mass has grown and is now likely metastatic. He has not ruled out seeking option for palliative treatment. 3. Lower leg weakness MRI showed lumbar metastasis no spinal cord lesion   4. Debility not able to ambulate due lower leg weakness. Recommend home health care.  Palliative home care would be a good option. 5. Initial consult note routed to primary continuity provider  6. Communicated plan of care with: Palliative IDT, patient and wife    GOALS OF CARE:  Patient/Health Care Proxy Stated Goals: Prolong life      TREATMENT PREFERENCES:   Code Status: full code     Advance Care Planning:  [] The [facility] Interdisciplinary Team has updated the ACP Navigator with [address] and Patient Capacity    Primary Decision [facility] ([address]): Wife is legal next of kin     Medical Interventions: Full interventions   Other Instructions: Other:  As far as possible, the palliative care team has discussed with patient / health care proxy about goals of care / treatment preferences for patient. HISTORY:     History obtained from: chart and patient     CHIEF COMPLAINT: renal mass     HPI/SUBJECTIVE:    The patient is:   [x] Verbal and participatory  [] Non-participatory due to:   Please see summary    Clinical Pain Assessment (nonverbal scale for nonverbal patients): Clinical Pain Assessment  Severity: 0          Duration: for how long has pt been experiencing pain (e.g., 2 days, 1 month, years)  Frequency: how often pain is an issue (e.g., several times per day, once every few days, constant)     FUNCTIONAL ASSESSMENT:     Palliative Performance Scale (PPS):  PPS: 50            PSYCHOSOCIAL/SPIRITUAL SCREENING:      Any spiritual / Confucianist concerns:  [] Yes /  [x] No    Caregiver Burnout:  [] Yes /  [] No /  [x] No Caregiver Present      Anticipatory grief assessment:   [x] Normal  / [] Maladaptive        REVIEW OF SYSTEMS:     Positive and pertinent negative findings in ROS are noted above in HPI. The following systems were [x] reviewed / [] unable to be reviewed as noted in HPI  Other findings are noted below. Systems: constitutional, ears/nose/mouth/throat, respiratory, gastrointestinal, genitourinary, musculoskeletal, integumentary, neurologic, psychiatric, endocrine.  Positive findings noted below.  Modified ESAS Completed by: provider   Fatigue: 3 Drowsiness: 0   Depression: 0 Pain: 0   Anxiety: 0 Nausea: 0     Dyspnea: 0                    PHYSICAL EXAM:     Wt Readings from Last 3 Encounters:   01/23/22 68 kg (150 lb)   07/26/21 72.6 kg (160 lb)   06/24/21 72.6 kg (160 lb)     Blood pressure 125/78, pulse (!) 102, temperature 97.7 °F (36.5 °C), resp. rate 20, height 5' 7\" (1.702 m), weight 68 kg (150 lb), SpO2 100 %. Pain:  Pain Scale 1: Numeric (0 - 10)  Pain Intensity 1: 6                     Constitutional: thin, alert, talking in NAD   Eyes: pupils equal   ENMT: moist MM   Cardiovascular: RRR   Respiratory: respirations not labored   Skin: warm, dry  Neurologic: alert oriented x 4   Psychiatric: full affect, no hallucinations         HISTORY:     Active Problems:    * No active hospital problems. *    Past Medical History:   Diagnosis Date    Heart failure ([facility]     Hypertension       No past surgical history on file. Family History   Problem Relation Age of Onset    Heart Disease Father      History reviewed, no pertinent family history. Social History     Tobacco Use    Smoking status: Former Smoker     Packs/day: 0.25    Smokeless tobacco: Never Used   Substance Use Topics    Alcohol use: No     Allergies   Allergen Reactions    Lopressor [Metoprolol Tartrate] Shortness of Breath      Current Facility-Administered Medications   Medication Dose Route Frequency    sodium chloride (NS) flush 5-10 mL  5-10 mL IntraVENous PRN     Current Outpatient Medications   Medication Sig    furosemide (Lasix) 40 mg tablet Take 1 Tablet by mouth two (2) times a day.  atorvastatin (LIPITOR) 40 mg tablet Take 1 Tablet by mouth nightly.  clopidogreL (Plavix) 75 mg tab Take 1 Tablet by mouth daily.  hydrALAZINE (APRESOLINE) 50 mg tablet Take 1 Tablet by mouth three (3) times daily.     isosorbide mononitrate ER (IMDUR) 30 mg tablet TAKE 1 TABLET BY MOUTH ONCE DAILY    carvediloL (COREG) 12.5 mg tablet TAKE 1 TABLET BY MOUTH TWICE DAILY FOR VENTRICULAR RATE CONTROL IN ATRIAL FIBRILLIATION    amLODIPine (NORVASC) 10 mg tablet Take 1 tablet by mouth once daily    nitroglycerin (NITROSTAT) 0.4 mg SL tablet 1 Tab by SubLINGual route every five (5) minutes as needed for Chest Pain. Up to 3 doses.  aspirin 81 mg chewable tablet Take 1 Tab by mouth daily. LAB AND IMAGING FINDINGS:     Lab Results   Component Value Date/Time    WBC 17.8 (H) 01/23/2022 10:00 AM    HGB 8.8 (L) 01/23/2022 10:00 AM    PLATELET 798 19/71/2152 10:00 AM     Lab Results   Component Value Date/Time    Sodium 138 01/23/2022 10:00 AM    Potassium 4.6 01/23/2022 10:00 AM    Chloride 104 01/23/2022 10:00 AM    CO2 28 01/23/2022 10:00 AM    BUN 16 01/23/2022 10:00 AM    Creatinine 1.31 (H) 01/23/2022 10:00 AM    Calcium 8.9 01/23/2022 10:00 AM    Magnesium 2.1 01/15/2019 05:15 AM    Phosphorus 3.9 01/14/2019 04:46 AM      Lab Results   Component Value Date/Time    Alk. phosphatase 95 01/23/2022 10:00 AM    Protein, total 7.6 01/23/2022 10:00 AM    Albumin 2.6 (L) 01/23/2022 10:00 AM    Globulin 5.0 (H) 01/23/2022 10:00 AM     Lab Results   Component Value Date/Time    INR 1.4 (H) 01/13/2019 05:45 PM    Prothrombin time 16.7 (H) 01/13/2019 05:45 PM      No results found for: IRON, FE, TIBC, IBCT, PSAT, FERR   No results found for: PH, PCO2, PO2  No components found for: [place]   Lab Results   Component Value Date/Time    CK 76 01/23/2022 10:00 AM    CK - MB 2.7 01/14/2019 04:46 AM              Total time: 30 minutes   Counseling / coordination time, spent as noted above:   > 50% counseling / coordination: yes with patient     Prolonged service was provided for  []30 min   []75 min in face to face time in the presence of the patient, spent as noted above.   Time Start:   Time End:

## 2022-01-24 NOTE — ED NOTES
Assumed care of pt, pt in bed awake alert and oriented x 4, continues on cardiac monitor, IV Zosyn and NS infusing at this time, pt denies pain, pt stable in no distress. Will continue to monitor.

## 2022-01-24 NOTE — ED NOTES
Report received from Pedro Soto RN  Rounded on pt  Pt resting comfortably at this time  No signs of distress noted

## 2022-01-24 NOTE — ED NOTES
Late Entry:    Received call from pts daughter in law wanting update on POC for pt. They are unaware of what is going on and do not understand how we dx cancer without doing a biopsy. Daughter in law not on contact information, placed her on hold to ask pt if I can inform her of what the POC is. Spoke with pt, consent given to update daughter in law on POC. Pt states that him and family have discussed what they want to do and pt would like to go home and they plan on hiring private hospice to come and take care of him. Per pt, he has a lot of family members in the medical field who have agreed to help take care of him. Spoke to ANDREA's (daughter in law) updated on results found on scans and conversation about family hiring private hospice. Per daughter in law, she was not aware of any of this and states this discussion did not happen.  This RN asked ANDREA's if her or any family could come to the hospital and be bedside for conversation to ensure proper POC is established and everyone is on the same page, ANDREA's is at work but another family member will be able to come and be present    Placed call to hospice to update on above, spoke to clinical manager who stated we needed to consult palliative at this time    This RN spoke to [facility] MD and updated on all of above conversations, MD stated to contact the  to come bedside to speak with pt    Contacted the ,  came bedside to speak with pt,  recommends palliative to come on board and will relay this message to [facility] MD

## 2022-01-24 NOTE — PROGRESS NOTES
CM uploaded patient with clinicals to [facility] and palliative, and [facility] in [place], patient has [address] Medicaid. DIOR faxed DME Wheelchair order with clinicals to [facility] [phone].              Shavonne Myers RN  Case Management [phone]

## 2022-01-24 NOTE — PROGRESS NOTES
Ayaan Booth and asked for order to [address] [place] [address].            Zina Vasquez, RN  Case Management [phone]

## 2022-01-24 NOTE — HOSPICE
Received call from Tom pt's nurse advising pt stated he had a discussion with his family and he decide he does not need a hospice consult, he plan to go home and they will hire their own hospice agency. She also advised he gave her permission to contact his daughter, who was not aware of the above conversation and had questions concerning his diagnosis and treatment. Please consult Palliative Care to establish goals of care as pt is not onboard with hospice. Will continue to follow from a distance until discharge. Hospice referral received. Chart review in process. Thank you for the referral to [address] [place] [address].  If we can be of further assistance please contact [address] Mcclelland, RN  Clinical Manager  [place] [address]., [facility], [address] [place] [address] Str.  [phone]  Email: [email or web address]

## 2022-01-25 NOTE — PROGRESS NOTES
Late Entry 1/25/2022 11:10am       Michael Lancaster with [facility] messaged CM in [place], accepted patient with Anticipated Start of Care of 01/26/2022.                  Ev Louis RN  Case Management [phone]

## 2022-01-25 NOTE — PROGRESS NOTES
CM paged from ED. CM called ED 6 times, call would not go through. CM called ED again, spoke with [facility] RN, updated her that CM will work on home health in am.   Wheelchair will be delivered to patient's home by [employer]. [facility] said patient has a Wheelchair at home, borrowed he can use. Sofia to discharge patient tonight.          Loma Dubin, RN  Case Management [phone]

## 2022-01-25 NOTE — ED NOTES
Did not receive any page from [facility], page sent again to see if pt can go home

## 2022-01-25 NOTE — PROGRESS NOTES
Late Entry 01/25/2022 9:30am       Hazle Pucker with [address], accepted patient in [place]. CM updated Home health for Home Health bridge to Hospice. [employer] printer is down, CM manually faxed clinicals with Home Health to [place] for Hazle Pucker with [facility]. CM received 2 forms from [employer] requesting Chase Conteh NP with Palliative signatures for Wheelchair order. CM called and spoke to Dale Perry in Palliative and updated, fax number [phone] given for Palliative. CM faxed [employer] forms needing signature from Chase Conteh NP, for wheelchair to Palliative to [phone].              Mónica Walker RN  Case Management [phone]

## 2022-01-25 NOTE — PROGRESS NOTES
CM received a page from ED, called ED back, spoke to [place], on hold for 12 minutes. 1900 CM received another page from ED, called 8 times, states party is not available. No accepting home health companies, referral sent out after 430pm today. CM can recheck with companies in am.     Wheelchair order sent to [facility] :[employer] will deliver to the home.          Bipin Cordova, RN  Case Management [phone]

## 2022-01-27 PROBLEM — D72.829 LEUKOCYTOSIS: Status: ACTIVE | Noted: 2022-01-01

## 2022-01-27 PROBLEM — E87.20 LACTIC ACIDOSIS: Status: ACTIVE | Noted: 2022-01-01

## 2022-01-27 PROBLEM — C64.9 RENAL CELL CARCINOMA (HCC): Status: ACTIVE | Noted: 2022-01-01

## 2022-01-27 PROBLEM — E86.0 DEHYDRATION: Status: ACTIVE | Noted: 2022-01-01

## 2022-01-27 PROBLEM — K56.609 SMALL BOWEL OBSTRUCTION (HCC): Status: ACTIVE | Noted: 2022-01-01

## 2022-02-12 PROBLEM — R41.82 ALTERED MENTAL STATUS: Status: ACTIVE | Noted: 2022-01-01

## 2022-02-12 PROBLEM — C64.9 METASTATIC RENAL CELL CARCINOMA (HCC): Status: ACTIVE | Noted: 2022-01-01
